# Patient Record
Sex: FEMALE | Race: ASIAN | Employment: FULL TIME | URBAN - METROPOLITAN AREA
[De-identification: names, ages, dates, MRNs, and addresses within clinical notes are randomized per-mention and may not be internally consistent; named-entity substitution may affect disease eponyms.]

---

## 2017-05-03 RX ORDER — ATORVASTATIN CALCIUM 20 MG/1
TABLET, FILM COATED ORAL
Qty: 90 TABLET | Refills: 0 | Status: SHIPPED | OUTPATIENT
Start: 2017-05-03 | End: 2017-10-06

## 2017-05-03 NOTE — TELEPHONE ENCOUNTER
Patient will need to compete lab order and office visit prior to any additional refills being issued

## 2017-09-22 ENCOUNTER — TELEPHONE (OUTPATIENT)
Dept: FAMILY MEDICINE CLINIC | Facility: CLINIC | Age: 57
End: 2017-09-22

## 2017-09-22 DIAGNOSIS — E55.9 VITAMIN D DEFICIENCY: ICD-10-CM

## 2017-09-22 DIAGNOSIS — Z00.00 LABORATORY EXAMINATION ORDERED AS PART OF A ROUTINE GENERAL MEDICAL EXAMINATION: Primary | ICD-10-CM

## 2017-09-22 DIAGNOSIS — E11.9 DIET-CONTROLLED TYPE 2 DIABETES MELLITUS (HCC): ICD-10-CM

## 2017-09-22 NOTE — TELEPHONE ENCOUNTER
Patient is calling she made a WWE w/pap for 11/2/17 at 830 am. She would like orders for blood work sent to [address] [place] [address]. Thank you.

## 2017-09-25 ENCOUNTER — HOSPITAL ENCOUNTER (OUTPATIENT)
Dept: MAMMOGRAPHY | Age: 57
Discharge: HOME | End: 2017-09-25
Attending: FAMILY MEDICINE
Payer: COMMERCIAL

## 2017-09-25 DIAGNOSIS — Z12.31 VISIT FOR SCREENING MAMMOGRAM: ICD-10-CM

## 2017-09-25 PROCEDURE — 77067 SCR MAMMO BI INCL CAD: CPT | Performed by: FAMILY MEDICINE

## 2017-10-06 DIAGNOSIS — E78.5 HYPERLIPIDEMIA LDL GOAL <100: Primary | ICD-10-CM

## 2017-10-06 RX ORDER — ATORVASTATIN CALCIUM 20 MG/1
TABLET, FILM COATED ORAL
Qty: 90 TABLET | Refills: 0 | Status: SHIPPED | OUTPATIENT
Start: 2017-10-06 | End: 2017-11-02

## 2017-10-06 NOTE — TELEPHONE ENCOUNTER
Rx request for med Atorvastatin 20 mg # 90 with 0 refills from [facility]. Last refilled on 5/3/17 # 90 with 0 refills. LOV 12/13/16 with Dr. Dick Lawrence for a typical chest pain. LOV 10/13/16 with Dr. Erin Acosta for annual physical and hyperlipidemia.

## 2017-11-02 ENCOUNTER — OFFICE VISIT (OUTPATIENT)
Dept: FAMILY MEDICINE CLINIC | Facility: CLINIC | Age: 57
End: 2017-11-02

## 2017-11-02 VITALS
HEIGHT: 62 IN | DIASTOLIC BLOOD PRESSURE: 70 MMHG | SYSTOLIC BLOOD PRESSURE: 104 MMHG | BODY MASS INDEX: 24.29 KG/M2 | HEART RATE: 62 BPM | RESPIRATION RATE: 14 BRPM | WEIGHT: 132 LBS | TEMPERATURE: 98 F

## 2017-11-02 DIAGNOSIS — Z01.419 VISIT FOR GYNECOLOGIC EXAMINATION: ICD-10-CM

## 2017-11-02 DIAGNOSIS — E78.5 HYPERLIPIDEMIA LDL GOAL <100: ICD-10-CM

## 2017-11-02 DIAGNOSIS — Z00.00 ANNUAL PHYSICAL EXAM: Primary | ICD-10-CM

## 2017-11-02 DIAGNOSIS — E11.9 DIET-CONTROLLED TYPE 2 DIABETES MELLITUS (HCC): ICD-10-CM

## 2017-11-02 PROCEDURE — 87624 HPV HI-RISK TYP POOLED RSLT: CPT | Performed by: FAMILY MEDICINE

## 2017-11-02 PROCEDURE — 99396 PREV VISIT EST AGE 40-64: CPT | Performed by: FAMILY MEDICINE

## 2017-11-02 PROCEDURE — 88175 CYTOPATH C/V AUTO FLUID REDO: CPT | Performed by: FAMILY MEDICINE

## 2017-11-02 RX ORDER — ATORVASTATIN CALCIUM 20 MG/1
20 TABLET, FILM COATED ORAL DAILY
Qty: 90 TABLET | Refills: 0 | Status: SHIPPED | OUTPATIENT
Start: 2017-11-02 | End: 2018-04-04

## 2017-11-02 NOTE — PROGRESS NOTES
Justin Brewster is a 56year old female who presents for a complete physical exam.   HPI:   Patient presents with:  Physical: WWE w/pap      Her last annual assessment has been over 1 year: Annual Physical due on 10/13/2017          Symptoms: is menopausal. AM   LDL 91 10/30/2017 06:10 AM   NONHDLC 115 10/30/2017 06:10 AM         Lab Results  Component Value Date/Time   A1C 6.0 (H) 10/30/2017 06:10 AM          Last Bryanna category :Karan Hager due on 09/25/2018   [unfilled]  No results found.         Maliha Hudson Specifically:  GEN:  No fever or fatigue  HEAD:  No headaches  EYES:  No vision change  EARS:  No hearing loss  MOUTH/THROAT:  No sore throat or dental problems  HEART:  No chest pain or palpitations  LUNG:  No SOB, cough or wheeze  GI:  No abdominal pain. breath sounds. She has no wheezes. She has no rales. She exhibits no mass and no tenderness. Right breast exhibits no inverted nipple and no tenderness. Left breast exhibits no inverted nipple and no tenderness. Breasts are symmetrical.   Abdominal: Soft. recommended low fat diet and aerobic exercise 30 minutes three times weekly.    Health maintenance, UTD   Immunizations: UTD, flu at work    The [employer] given for: exercise, low fat diet, The patient indicates understanding of these issues and agrees to the rachel

## 2018-04-04 DIAGNOSIS — E78.5 HYPERLIPIDEMIA LDL GOAL <100: ICD-10-CM

## 2018-04-04 RX ORDER — ATORVASTATIN CALCIUM 20 MG/1
TABLET, FILM COATED ORAL
Qty: 90 TABLET | Refills: 1 | Status: SHIPPED | OUTPATIENT
Start: 2018-04-04 | End: 2018-09-30

## 2018-04-04 NOTE — TELEPHONE ENCOUNTER
Incoming request for Atorvastatin. LOV 11/2/2017 and last labs done 10/30/2017. No future appointments. Medication passed protocol. Medication sent.

## 2018-06-04 ENCOUNTER — OFFICE VISIT (OUTPATIENT)
Dept: FAMILY MEDICINE CLINIC | Facility: CLINIC | Age: 58
End: 2018-06-04

## 2018-06-04 VITALS
HEART RATE: 68 BPM | DIASTOLIC BLOOD PRESSURE: 54 MMHG | WEIGHT: 137 LBS | BODY MASS INDEX: 25.21 KG/M2 | HEIGHT: 62 IN | SYSTOLIC BLOOD PRESSURE: 122 MMHG | RESPIRATION RATE: 14 BRPM

## 2018-06-04 DIAGNOSIS — M79.644 FINGER PAIN, RIGHT: Primary | ICD-10-CM

## 2018-06-04 PROCEDURE — 99213 OFFICE O/P EST LOW 20 MIN: CPT | Performed by: FAMILY MEDICINE

## 2018-06-04 NOTE — PROGRESS NOTES
Trena Martin is a 66year old female. HPI:   Patient presents with pain in the right index finger. She has had symptoms for 2 weeks. She thought she may have hit the finger, as she felt there was some mild bruising.   However, she has no recollection simply be some mild tendinitis. At this point I recommended she try not to overuse the finger, especially with typing, mouse work, and use of her phone. I had also like to see her using some ice and ibuprofen 4-600 mg 2-3 times daily.   At this point her

## 2018-08-16 ENCOUNTER — TELEPHONE (OUTPATIENT)
Dept: FAMILY MEDICINE CLINIC | Facility: CLINIC | Age: 58
End: 2018-08-16

## 2018-08-16 DIAGNOSIS — R73.9 HYPERGLYCEMIA: ICD-10-CM

## 2018-08-16 DIAGNOSIS — Z12.31 VISIT FOR SCREENING MAMMOGRAM: ICD-10-CM

## 2018-08-16 DIAGNOSIS — Z00.00 LABORATORY EXAMINATION ORDERED AS PART OF A ROUTINE GENERAL MEDICAL EXAMINATION: ICD-10-CM

## 2018-08-16 NOTE — TELEPHONE ENCOUNTER
Please call in lab orders for pt scheduled physical for 11/5/18 with Dr. Debbie Rousseau please call labs into [facility]. Thank you.

## 2018-09-30 DIAGNOSIS — E78.5 HYPERLIPIDEMIA LDL GOAL <100: ICD-10-CM

## 2018-10-02 RX ORDER — ATORVASTATIN CALCIUM 20 MG/1
TABLET, FILM COATED ORAL
Qty: 90 TABLET | Refills: 1 | Status: SHIPPED | OUTPATIENT
Start: 2018-10-02 | End: 2019-03-30

## 2018-11-05 ENCOUNTER — OFFICE VISIT (OUTPATIENT)
Dept: FAMILY MEDICINE CLINIC | Facility: CLINIC | Age: 58
End: 2018-11-05
Payer: COMMERCIAL

## 2018-11-05 VITALS
TEMPERATURE: 97 F | RESPIRATION RATE: 12 BRPM | HEART RATE: 60 BPM | WEIGHT: 135 LBS | DIASTOLIC BLOOD PRESSURE: 60 MMHG | SYSTOLIC BLOOD PRESSURE: 116 MMHG | HEIGHT: 62 IN | BODY MASS INDEX: 24.84 KG/M2

## 2018-11-05 DIAGNOSIS — E11.9 DIET-CONTROLLED TYPE 2 DIABETES MELLITUS (HCC): ICD-10-CM

## 2018-11-05 DIAGNOSIS — Z00.00 ANNUAL PHYSICAL EXAM: Primary | ICD-10-CM

## 2018-11-05 DIAGNOSIS — E78.5 HYPERLIPIDEMIA LDL GOAL <100: ICD-10-CM

## 2018-11-05 PROCEDURE — 99396 PREV VISIT EST AGE 40-64: CPT | Performed by: FAMILY MEDICINE

## 2018-11-05 PROCEDURE — 82570 ASSAY OF URINE CREATININE: CPT | Performed by: FAMILY MEDICINE

## 2018-11-05 PROCEDURE — 82043 UR ALBUMIN QUANTITATIVE: CPT | Performed by: FAMILY MEDICINE

## 2018-11-05 NOTE — PROGRESS NOTES
Rhett Escalera is a 66year old female who presents for a complete physical exam.   HPI:   Patient presents with:  Physical  Diabetes: due for eye exam, foot exam    Foot pain at times, worse with travel.    Her last annual assessment has been over 1 year: A T4F 1.0 10/30/2017 06:10 AM       Lab Results   Component Value Date/Time    CHOLEST 206 (H) 11/02/2018 05:51 AM    HDL 45 (L) 11/02/2018 05:51 AM    TRIG 167 (H) 11/02/2018 05:51 AM     (H) 11/02/2018 05:51 AM    NONHDLC 161 (H) 11/02/2018 05:51 AM No abdominal pain.   No N/V/D/C  :  No dysuria  MS:  No joint pain or swelling  NEURO:  Denies numbness or tingling  PSYCH:  No mood concerns or anxiety     EXAM:   /60 (BP Location: Left arm)   Pulse 60   Temp 97 °F (36.1 °C) (Oral)   Resp 12   Ht distension. There is no hepatosplenomegaly. There is no tenderness. There is no rebound and no guarding. No hernia. Genitourinary: No breast swelling, tenderness, discharge or bleeding. Musculoskeletal: Normal range of motion. Lymphadenopathy:      Sh fat diet, The patient indicates understanding of these issues and agrees to the plan. The patient is asked to return for CPX in 1 years.     Assessment:  Problem List Items Addressed This Visit        Cardiovascular    Hyperlipidemia LDL goal <100    Overv

## 2018-11-06 NOTE — ASSESSMENT & PLAN NOTE
As for her Diabetes, it is well controlled, no significant medication side effects noted. Recommendations are: continue present meds, lose weight by increased dietary compliance and exercise and will check labs as ordered.     Lab Results   Component Va

## 2018-11-07 ENCOUNTER — TELEPHONE (OUTPATIENT)
Dept: FAMILY MEDICINE CLINIC | Facility: CLINIC | Age: 58
End: 2018-11-07

## 2018-11-07 NOTE — TELEPHONE ENCOUNTER
Pt left message upset that on her History list Type 2 Diabetes was listed. Pt  states she never was truly a Diabetic, and wants Dx removed. Please advise. Routed to Dr Rochelle Sandy.

## 2018-11-07 NOTE — TELEPHONE ENCOUNTER
I specifically talked to her about it at visit. From 10/1/2014:Patient complains of elevated FBS< hx GDM, A1c now up ot 6.4 and we discuused options for Diet controlled DM, dx  Today. .     She is purely diet-controlled we do not need to do anything more b

## 2018-11-07 NOTE — TELEPHONE ENCOUNTER
Patient saw online that she was a type 2 diabetic. Patient states she is not and wants that removed and more information. She has no idea why that is noted on her chart.

## 2018-11-08 NOTE — TELEPHONE ENCOUNTER
Spoke with Pt and explain MD explanation as to why Pt still classifies as a Diabetic, but encouraged Pt that numbers have improved over the last few years.

## 2019-03-30 DIAGNOSIS — E78.5 HYPERLIPIDEMIA LDL GOAL <100: ICD-10-CM

## 2019-04-03 RX ORDER — ATORVASTATIN CALCIUM 20 MG/1
TABLET, FILM COATED ORAL
Qty: 90 TABLET | Refills: 1 | Status: SHIPPED | OUTPATIENT
Start: 2019-04-03 | End: 2019-09-25

## 2019-04-03 NOTE — TELEPHONE ENCOUNTER
Medication refilled per protocol.      Requested Prescriptions     Signed Prescriptions Disp Refills   • ATORVASTATIN 20 MG Oral Tab 90 tablet 1     Sig: TAKE 1 TABLET DAILY     Authorizing Provider: Awilda Macias     Ordering User: Chiara Mart

## 2019-07-08 DIAGNOSIS — Z13.5 SCREENING FOR DIABETIC RETINOPATHY: Primary | ICD-10-CM

## 2019-09-25 DIAGNOSIS — E78.5 HYPERLIPIDEMIA LDL GOAL <100: ICD-10-CM

## 2019-09-26 RX ORDER — ATORVASTATIN CALCIUM 20 MG/1
TABLET, FILM COATED ORAL
Qty: 90 TABLET | Refills: 4 | Status: SHIPPED | OUTPATIENT
Start: 2019-09-26 | End: 2020-12-19

## 2019-09-26 NOTE — TELEPHONE ENCOUNTER
Requested Prescriptions     Pending Prescriptions Disp Refills   • ATORVASTATIN 20 MG Oral Tab [Pharmacy Med Name: ATORVASTATIN TABS 20MG] 90 tablet 4     Sig: TAKE 1 TABLET DAILY     LOV 11/5/2018     Patient was asked to follow-up in: one year    Appoint

## 2019-11-04 ENCOUNTER — TELEPHONE (OUTPATIENT)
Dept: FAMILY MEDICINE CLINIC | Facility: CLINIC | Age: 59
End: 2019-11-04

## 2019-11-04 DIAGNOSIS — Z12.31 ENCOUNTER FOR SCREENING MAMMOGRAM FOR BREAST CANCER: Primary | ICD-10-CM

## 2019-11-04 DIAGNOSIS — R73.9 HYPERGLYCEMIA: ICD-10-CM

## 2019-11-04 DIAGNOSIS — Z12.31 VISIT FOR SCREENING MAMMOGRAM: ICD-10-CM

## 2019-11-04 DIAGNOSIS — Z00.00 LABORATORY EXAMINATION ORDERED AS PART OF A ROUTINE GENERAL MEDICAL EXAMINATION: ICD-10-CM

## 2019-11-04 DIAGNOSIS — E11.9 DIET-CONTROLLED TYPE 2 DIABETES MELLITUS (HCC): Primary | ICD-10-CM

## 2019-11-04 NOTE — TELEPHONE ENCOUNTER
Diagnoses and all orders for this visit:    Diet-controlled type 2 diabetes mellitus ([facility] [address]  -     HEMOGLOBIN A1C  -     MICROALB/CREAT RATIO, RANDOM URINE    Visit for screening mammogram  -     [facility] SCREENING BILAT (CPT=77067);  Future    Laboratory examina

## 2019-11-04 NOTE — TELEPHONE ENCOUNTER
Please enter lab orders for the patient's upcoming physical appointment. Physical scheduled:    Your appointments     Date & Time Appointment Department [facility])    Nov 15, 2019 12:00 PM CST Physical - Established with Won Canseco MD [facility]

## 2019-11-11 ENCOUNTER — HOSPITAL ENCOUNTER (OUTPATIENT)
Dept: MAMMOGRAPHY | Age: 59
Discharge: HOME | End: 2019-11-11
Attending: FAMILY MEDICINE
Payer: COMMERCIAL

## 2019-11-11 DIAGNOSIS — Z12.31 VISIT FOR SCREENING MAMMOGRAM: ICD-10-CM

## 2019-11-11 PROCEDURE — 77067 SCR MAMMO BI INCL CAD: CPT | Performed by: FAMILY MEDICINE

## 2019-11-11 PROCEDURE — 77063 BREAST TOMOSYNTHESIS BI: CPT | Performed by: FAMILY MEDICINE

## 2019-11-15 ENCOUNTER — OFFICE VISIT (OUTPATIENT)
Dept: FAMILY MEDICINE CLINIC | Facility: CLINIC | Age: 59
End: 2019-11-15
Payer: COMMERCIAL

## 2019-11-15 VITALS
TEMPERATURE: 98 F | HEART RATE: 60 BPM | DIASTOLIC BLOOD PRESSURE: 62 MMHG | HEIGHT: 62.5 IN | BODY MASS INDEX: 24.22 KG/M2 | SYSTOLIC BLOOD PRESSURE: 92 MMHG | RESPIRATION RATE: 12 BRPM | WEIGHT: 135 LBS

## 2019-11-15 DIAGNOSIS — E78.2 MIXED HYPERLIPIDEMIA: ICD-10-CM

## 2019-11-15 DIAGNOSIS — E03.9 ACQUIRED HYPOTHYROIDISM: ICD-10-CM

## 2019-11-15 DIAGNOSIS — Z00.00 ANNUAL PHYSICAL EXAM: Primary | ICD-10-CM

## 2019-11-15 DIAGNOSIS — E11.9 DIET-CONTROLLED TYPE 2 DIABETES MELLITUS (HCC): ICD-10-CM

## 2019-11-15 PROCEDURE — 99396 PREV VISIT EST AGE 40-64: CPT | Performed by: FAMILY MEDICINE

## 2019-11-15 NOTE — ASSESSMENT & PLAN NOTE
Diagnosed today. Further work-up in progress. Currently, she is not ready for medications but I am open to ordering it if this does happen.   In the meantime, information is given and repeat levels in 3 months  Thyroid  (most recent labs)   Lab Results

## 2019-11-15 NOTE — PROGRESS NOTES
Alissa Johnson is a 64year old female who presents for a complete physical exam.   HPI:   Patient presents with:  Physical: last pap 11/2/17- normal, last mammo 11/11/19- benign     Her last annual assessment has been over 1 year: Annual Physical due on 11 AM    ALB 4.4 11/09/2019 06:40 AM    TP 6.9 11/09/2019 06:40 AM    ALKPHO 71 11/09/2019 06:40 AM    AST 20 11/09/2019 06:40 AM    ALT 29 11/09/2019 06:40 AM    BILT 0.9 11/09/2019 06:40 AM    TSH 2.25 09/29/2014 08:50 AM    T4F 1.1 11/09/2019 06:40 AM point review of systems was completed. Pertinent positives and negatives noted in the the HPI.   Specifically:  GEN:  No fever or fatigue  HEAD:  No headaches  EYES:  No vision change  EARS:  No hearing loss  MOUTH/THROAT:  No sore throat or dental problem not present. Carotid bruit not present. Pulmonary/Chest: Effort normal and breath sounds normal. No respiratory distress. She has no decreased breath sounds. She has no wheezes. She has no rales. She exhibits no tenderness. Abdominal: Soft.  Bowel sounds for: exercise, low fat diet, The patient indicates understanding of these issues and agrees to the plan. The patient is asked to return for CPX in 1 years.     Assessment:  Problem List Items Addressed This Visit        Cardiovascular    Mixed hyperlipidem

## 2019-11-15 NOTE — PATIENT INSTRUCTIONS
PM meds are Benadryl,   Unasom is better for sleep      Common Thyroid Problems    The thyroid is a gland in the neck. It makes thyroid hormone. A hormone is a chemical messenger for the body.  If there is a problem with the thyroid, the level of thyroid ho common in people who’ve had medical radiation to the head or neck. Sometimes nodules can be felt on the outside of the neck. Most of the time, the nodules cause no symptoms and don’t affect the amount of thyroid hormone. Most nodules are not cancer.  But so

## 2020-12-09 ENCOUNTER — TELEPHONE (OUTPATIENT)
Dept: FAMILY MEDICINE CLINIC | Facility: CLINIC | Age: 60
End: 2020-12-09

## 2020-12-09 DIAGNOSIS — E11.9 DIET-CONTROLLED TYPE 2 DIABETES MELLITUS (HCC): Primary | ICD-10-CM

## 2020-12-09 DIAGNOSIS — E03.9 ACQUIRED HYPOTHYROIDISM: ICD-10-CM

## 2020-12-09 DIAGNOSIS — Z00.00 LABORATORY EXAMINATION ORDERED AS PART OF A ROUTINE GENERAL MEDICAL EXAMINATION: ICD-10-CM

## 2020-12-09 DIAGNOSIS — E78.2 MIXED HYPERLIPIDEMIA: ICD-10-CM

## 2020-12-09 DIAGNOSIS — Z12.31 VISIT FOR SCREENING MAMMOGRAM: ICD-10-CM

## 2020-12-09 DIAGNOSIS — E55.9 VITAMIN D DEFICIENCY: ICD-10-CM

## 2020-12-09 DIAGNOSIS — R73.9 HYPERGLYCEMIA: ICD-10-CM

## 2020-12-09 NOTE — TELEPHONE ENCOUNTER
Diagnoses and all orders for this visit:    Diet-controlled type 2 diabetes mellitus ([facility] [address]  -     COMP METABOLIC PANEL (14)  -     LIPID PANEL  -     HEMOGLOBIN A1C  -     MICROALB/CREAT RATIO, RANDOM URINE    Acquired hypothyroidism  -     TSH W REFLEX TO

## 2020-12-09 NOTE — TELEPHONE ENCOUNTER
Please enter lab orders for the patient's upcoming physical appointment. Physical scheduled:    Your appointments     Date & Time Appointment Department [facility])    Jan 07, 2021  9:30 AM CST Physical - Established with Ellie Russo MD [employer] [facility]

## 2020-12-18 DIAGNOSIS — E78.5 HYPERLIPIDEMIA LDL GOAL <100: ICD-10-CM

## 2020-12-19 RX ORDER — ATORVASTATIN CALCIUM 20 MG/1
TABLET, FILM COATED ORAL
Qty: 90 TABLET | Refills: 3 | Status: SHIPPED | OUTPATIENT
Start: 2020-12-19 | End: 2021-11-26

## 2020-12-19 NOTE — TELEPHONE ENCOUNTER
Requested Prescriptions     Pending Prescriptions Disp Refills   • ATORVASTATIN 20 MG Oral Tab [Pharmacy Med Name: ATORVASTATIN TABS 20MG] 90 tablet 3     Sig: TAKE 1 TABLET DAILY     LOV 11/12/2019    Patient was asked to follow-up in: 4 months    Appoint

## 2021-02-01 ENCOUNTER — TELEPHONE (OUTPATIENT)
Dept: FAMILY MEDICINE CLINIC | Facility: CLINIC | Age: 61
End: 2021-02-01

## 2021-02-01 DIAGNOSIS — E03.9 ACQUIRED HYPOTHYROIDISM: Primary | ICD-10-CM

## 2021-02-01 RX ORDER — LEVOTHYROXINE SODIUM 0.05 MG/1
50 TABLET ORAL DAILY
Qty: 90 TABLET | Refills: 3 | Status: SHIPPED | OUTPATIENT
Start: 2021-02-01 | End: 2021-04-15

## 2021-02-01 NOTE — TELEPHONE ENCOUNTER
LOV 11/15/2019. No future appointments. Pt had appt schedules 1/7/2021, but she cancelled it. On 12/14/2020, Dr Juan Daniel Reid indicated that TSH is again elevated and you would discuss treatment at 1/7/2021 appt.     Spoke to pt and she states that she is too busy

## 2021-02-01 NOTE — TELEPHONE ENCOUNTER
Pt is calling Dr. Zenovia Cooks recommended she start medication for her thyroid she said please send to [facility] to start and then to see if it can go to mail order. She said Dr. Zenovia Cooks knows what it is.  She would like a call back from the nurse to let her know evelyn

## 2021-02-02 NOTE — TELEPHONE ENCOUNTER
Diagnoses and all orders for this visit:    Acquired hypothyroidism  -     Levothyroxine Sodium 50 MCG Oral Tab; Take 1 tablet (50 mcg total) by mouth daily.  -     TSH+FREE T4; Future       Will start 50 mcg and recheck in about 6 or 8 weeks.   Okay to not

## 2021-02-02 NOTE — TELEPHONE ENCOUNTER
Patient notified of below directives. She verbalized understanding and agrees with plan. Encouraged pt to schedule appt soon.

## 2021-04-09 DIAGNOSIS — Z23 NEED FOR VACCINATION: ICD-10-CM

## 2021-04-13 ENCOUNTER — LAB ENCOUNTER (OUTPATIENT)
Dept: LAB | Age: 61
End: 2021-04-13
Attending: FAMILY MEDICINE
Payer: COMMERCIAL

## 2021-04-13 DIAGNOSIS — E03.9 ACQUIRED HYPOTHYROIDISM: ICD-10-CM

## 2021-04-13 PROCEDURE — 84443 ASSAY THYROID STIM HORMONE: CPT | Performed by: FAMILY MEDICINE

## 2021-04-13 PROCEDURE — 84439 ASSAY OF FREE THYROXINE: CPT | Performed by: FAMILY MEDICINE

## 2021-04-13 PROCEDURE — 36415 COLL VENOUS BLD VENIPUNCTURE: CPT | Performed by: FAMILY MEDICINE

## 2021-04-15 ENCOUNTER — OFFICE VISIT (OUTPATIENT)
Dept: FAMILY MEDICINE CLINIC | Facility: CLINIC | Age: 61
End: 2021-04-15
Payer: COMMERCIAL

## 2021-04-15 VITALS
BODY MASS INDEX: 24.9 KG/M2 | DIASTOLIC BLOOD PRESSURE: 66 MMHG | HEART RATE: 62 BPM | TEMPERATURE: 98 F | WEIGHT: 138.81 LBS | RESPIRATION RATE: 18 BRPM | SYSTOLIC BLOOD PRESSURE: 110 MMHG | HEIGHT: 62.5 IN

## 2021-04-15 DIAGNOSIS — Z01.89 ROUTINE LAB DRAW: ICD-10-CM

## 2021-04-15 DIAGNOSIS — E11.9 DIET-CONTROLLED TYPE 2 DIABETES MELLITUS (HCC): Primary | ICD-10-CM

## 2021-04-15 DIAGNOSIS — E03.9 ACQUIRED HYPOTHYROIDISM: ICD-10-CM

## 2021-04-15 DIAGNOSIS — E78.2 MIXED HYPERLIPIDEMIA: ICD-10-CM

## 2021-04-15 PROCEDURE — 3008F BODY MASS INDEX DOCD: CPT | Performed by: FAMILY MEDICINE

## 2021-04-15 PROCEDURE — 3078F DIAST BP <80 MM HG: CPT | Performed by: FAMILY MEDICINE

## 2021-04-15 PROCEDURE — 99213 OFFICE O/P EST LOW 20 MIN: CPT | Performed by: FAMILY MEDICINE

## 2021-04-15 PROCEDURE — 3074F SYST BP LT 130 MM HG: CPT | Performed by: FAMILY MEDICINE

## 2021-04-15 RX ORDER — LEVOTHYROXINE SODIUM 0.03 MG/1
25 TABLET ORAL DAILY
Qty: 90 TABLET | Refills: 3 | Status: SHIPPED | OUTPATIENT
Start: 2021-04-15 | End: 2021-09-01

## 2021-04-15 NOTE — ASSESSMENT & PLAN NOTE
50 mcg to start, now over treated.   Thyroid  (most recent labs)   Lab Results   Component Value Date/Time    TSH 0.138 (L) 04/13/2021 03:33 PM    T4F 1.3 04/13/2021 03:33 PM    TSHT4 7.54 (H) 12/14/2020 06:18 AM         Endocrine Medications          Levot

## 2021-04-15 NOTE — PROGRESS NOTES
Bean Montesinos is a 63year old female coming in for had concerns including Lab Results (4/13 Tsh ). HPI/Subjective:   HPI follow up thryois. TSH was 7, now 0.15, feeling foos.  ileana CONNER    ROS: GENERAL HEALTH: feels well otherwise    Objective: 2020  Assessment & Plan:  50 mcg to start, now over treated.   Thyroid  (most recent labs)   Lab Results   Component Value Date/Time    TSH 0.138 (L) 04/13/2021 03:33 PM    T4F 1.3 04/13/2021 03:33 PM    TSHT4 7.54 (H) 12/14/2020 06:18 AM         Endocrine

## 2021-04-21 ENCOUNTER — IMMUNIZATION (OUTPATIENT)
Dept: LAB | Age: 61
End: 2021-04-21
Attending: HOSPITALIST
Payer: COMMERCIAL

## 2021-04-21 DIAGNOSIS — Z23 NEED FOR VACCINATION: Primary | ICD-10-CM

## 2021-04-21 PROCEDURE — 0002A SARSCOV2 VAC 30MCG/0.3ML IM: CPT

## 2021-08-24 ENCOUNTER — HOSPITAL ENCOUNTER (OUTPATIENT)
Dept: MAMMOGRAPHY | Age: 61
Discharge: HOME | End: 2021-08-24
Attending: FAMILY MEDICINE
Payer: COMMERCIAL

## 2021-08-24 DIAGNOSIS — Z12.31 VISIT FOR SCREENING MAMMOGRAM: ICD-10-CM

## 2021-08-24 PROCEDURE — 77067 SCR MAMMO BI INCL CAD: CPT | Performed by: FAMILY MEDICINE

## 2021-08-24 PROCEDURE — 77063 BREAST TOMOSYNTHESIS BI: CPT | Performed by: FAMILY MEDICINE

## 2021-08-25 PROCEDURE — 3044F HG A1C LEVEL LT 7.0%: CPT | Performed by: FAMILY MEDICINE

## 2021-08-26 LAB
ABSOLUTE BASOPHILS: 27 CELLS/UL (ref 0–200)
ABSOLUTE EOSINOPHILS: 68 CELLS/UL (ref 15–500)
ABSOLUTE LYMPHOCYTES: 1965 CELLS/UL (ref 850–3900)
ABSOLUTE MONOCYTES: 469 CELLS/UL (ref 200–950)
ABSOLUTE NEUTROPHILS: 4270 CELLS/UL (ref 1500–7800)
ALBUMIN/GLOBULIN RATIO: 1.8 (CALC) (ref 1–2.5)
ALBUMIN: 4.2 G/DL (ref 3.6–5.1)
ALKALINE PHOSPHATASE: 77 U/L (ref 37–153)
ALT: 32 U/L (ref 6–29)
AST: 24 U/L (ref 10–35)
BASOPHILS: 0.4 %
BILIRUBIN, TOTAL: 0.8 MG/DL (ref 0.2–1.2)
BUN: 13 MG/DL (ref 7–25)
CALCIUM: 9.3 MG/DL (ref 8.6–10.4)
CARBON DIOXIDE: 29 MMOL/L (ref 20–32)
CHLORIDE: 105 MMOL/L (ref 98–110)
CHOL/HDLC RATIO: 3.1 (CALC)
CHOLESTEROL, TOTAL: 151 MG/DL
CREATININE: 0.64 MG/DL (ref 0.5–1.05)
EGFR IF AFRICN AM: 113 ML/MIN/1.73M2
EGFR IF NONAFRICN AM: 98 ML/MIN/1.73M2
EOSINOPHILS: 1 %
GLOBULIN: 2.3 G/DL (CALC) (ref 1.9–3.7)
GLUCOSE: 110 MG/DL (ref 65–99)
HDL CHOLESTEROL: 49 MG/DL
HEMATOCRIT: 43.8 % (ref 35–45)
HEMOGLOBIN A1C: 6.2 % OF TOTAL HGB
HEMOGLOBIN: 14.3 G/DL (ref 11.7–15.5)
LDL-CHOLESTEROL: 66 MG/DL (CALC)
LYMPHOCYTES: 28.9 %
MCH: 30 PG (ref 27–33)
MCHC: 32.6 G/DL (ref 32–36)
MCV: 91.8 FL (ref 80–100)
MONOCYTES: 6.9 %
MPV: 9.5 FL (ref 7.5–12.5)
NEUTROPHILS: 62.8 %
NON-HDL CHOLESTEROL: 102 MG/DL (CALC)
PLATELET COUNT: 214 THOUSAND/UL (ref 140–400)
POTASSIUM: 4 MMOL/L (ref 3.5–5.3)
PROTEIN, TOTAL: 6.5 G/DL (ref 6.1–8.1)
RDW: 12.6 % (ref 11–15)
RED BLOOD CELL COUNT: 4.77 MILLION/UL (ref 3.8–5.1)
SODIUM: 139 MMOL/L (ref 135–146)
TRIGLYCERIDES: 275 MG/DL
VITAMIN D, 25-OH, TOTAL: 39 NG/ML (ref 30–100)
WHITE BLOOD CELL COUNT: 6.8 THOUSAND/UL (ref 3.8–10.8)

## 2021-08-28 NOTE — ASSESSMENT & PLAN NOTE
Stable, Continue present management.     Thyroid  (most recent labs)   Lab Results   Component Value Date/Time    TSH 0.138 (L) 04/13/2021 03:33 PM    T4F 1.3 04/13/2021 03:33 PM    TSHT4 7.54 (H) 12/14/2020 06:18 AM         Endocrine Medications          L

## 2021-08-30 ENCOUNTER — OFFICE VISIT (OUTPATIENT)
Dept: FAMILY MEDICINE CLINIC | Facility: CLINIC | Age: 61
End: 2021-08-30
Payer: COMMERCIAL

## 2021-08-30 VITALS
HEART RATE: 86 BPM | RESPIRATION RATE: 16 BRPM | HEIGHT: 62 IN | BODY MASS INDEX: 24.59 KG/M2 | SYSTOLIC BLOOD PRESSURE: 130 MMHG | WEIGHT: 133.63 LBS | DIASTOLIC BLOOD PRESSURE: 74 MMHG

## 2021-08-30 DIAGNOSIS — E78.2 MIXED HYPERLIPIDEMIA: ICD-10-CM

## 2021-08-30 DIAGNOSIS — Z00.00 ANNUAL PHYSICAL EXAM: Primary | ICD-10-CM

## 2021-08-30 DIAGNOSIS — Z01.419 VISIT FOR GYNECOLOGIC EXAMINATION: ICD-10-CM

## 2021-08-30 DIAGNOSIS — E03.9 ACQUIRED HYPOTHYROIDISM: ICD-10-CM

## 2021-08-30 DIAGNOSIS — E11.9 DIET-CONTROLLED TYPE 2 DIABETES MELLITUS (HCC): ICD-10-CM

## 2021-08-30 PROCEDURE — 87624 HPV HI-RISK TYP POOLED RSLT: CPT | Performed by: FAMILY MEDICINE

## 2021-08-30 PROCEDURE — 99396 PREV VISIT EST AGE 40-64: CPT | Performed by: FAMILY MEDICINE

## 2021-08-30 PROCEDURE — 90471 IMMUNIZATION ADMIN: CPT | Performed by: FAMILY MEDICINE

## 2021-08-30 PROCEDURE — 3008F BODY MASS INDEX DOCD: CPT | Performed by: FAMILY MEDICINE

## 2021-08-30 PROCEDURE — 3075F SYST BP GE 130 - 139MM HG: CPT | Performed by: FAMILY MEDICINE

## 2021-08-30 PROCEDURE — 90472 IMMUNIZATION ADMIN EACH ADD: CPT | Performed by: FAMILY MEDICINE

## 2021-08-30 PROCEDURE — 90732 PPSV23 VACC 2 YRS+ SUBQ/IM: CPT | Performed by: FAMILY MEDICINE

## 2021-08-30 PROCEDURE — 90750 HZV VACC RECOMBINANT IM: CPT | Performed by: FAMILY MEDICINE

## 2021-08-30 PROCEDURE — 3078F DIAST BP <80 MM HG: CPT | Performed by: FAMILY MEDICINE

## 2021-08-30 NOTE — PROGRESS NOTES
Crystal Kevin is a 63year old female who presents for a complete physical exam.     had concerns including Physical (WWE, with pap ).    Her last annual assessment has been over 1 year: Annual Physical due on 11/15/2020       HPI/Subjective:     Symptoms: Never done  FIT/FOBT Colorectal Screening Never done  Zoster Vaccines(1 of 2) Never done  Diabetes Care Dilated Eye Exam due on 12/03/2019  Colonoscopy due on 03/03/2020  Annual Depression Screen due on 11/15/2020  Annual Physical due on 11/15/2020      Re Not on file      Highest education level: Not on file    Occupational History      Occupation: Engineering     Tobacco Use      Smoking status: Never Smoker      Smokeless tobacco: Never Used    Substance and Sexual Activity      Alcohol use:  Yes        Al S2 normal. No murmur heard. Pulmonary:      Effort: Pulmonary effort is normal.      Breath sounds: Normal breath sounds. Chest:      Chest wall: No mass. Breasts: Breasts are symmetrical.         Right: No inverted nipple or tenderness. 10/28/2020   • Flucelvax 0.5 Ml Quad PFS Single Dose 10/15/2019   • Influenza 10/01/2016, 10/16/2017, 10/29/2018   • TDAP 10/01/2014         Pt info given for: exercise, low fat diet, The patient indicates understanding of these issues and agrees to the pl ThinPrep PAP Smear B      I am having Ajay Josias maintain her Vitamin D3 (Cholecalciferol), aspirin, atorvastatin, and levothyroxine. Return in about 6 months (around 2/28/2022) for diabetes follow up.

## 2021-08-31 ENCOUNTER — TELEPHONE (OUTPATIENT)
Dept: FAMILY MEDICINE CLINIC | Facility: CLINIC | Age: 61
End: 2021-08-31

## 2021-08-31 DIAGNOSIS — E03.9 ACQUIRED HYPOTHYROIDISM: Primary | ICD-10-CM

## 2021-08-31 NOTE — TELEPHONE ENCOUNTER
Pt calling to ask if she is still suppose to take thyroid medication. If so, she will need [facility]

## 2021-09-01 RX ORDER — LEVOTHYROXINE SODIUM 0.03 MG/1
25 TABLET ORAL DAILY
Qty: 90 TABLET | Refills: 3 | Status: SHIPPED | OUTPATIENT
Start: 2021-09-01 | End: 2022-09-01

## 2021-09-01 NOTE — TELEPHONE ENCOUNTER
1. Acquired hypothyroidism (Primary)  Overview:  Dx 11/15/2019 with TSH 4.77 in 2017 and 7.3 in 11/2019, Levothyroxine 50 started fall 2020  Orders:  -     Levothyroxine Sodium; Take 1 tablet (25 mcg total) by mouth daily. Dispense: 90 tablet;  Refill: 3

## 2021-09-01 NOTE — TELEPHONE ENCOUNTER
Called and LMOM that Dr Lidia Villalobos wants her to continue the levothyroxine and sent it to her mail order

## 2021-09-02 LAB — HPV I/H RISK 1 DNA SPEC QL NAA+PROBE: NEGATIVE

## 2021-11-25 DIAGNOSIS — E78.5 HYPERLIPIDEMIA LDL GOAL <100: ICD-10-CM

## 2021-11-26 RX ORDER — ATORVASTATIN CALCIUM 20 MG/1
TABLET, FILM COATED ORAL
Qty: 90 TABLET | Refills: 0 | Status: SHIPPED | OUTPATIENT
Start: 2021-11-26

## 2021-11-26 NOTE — TELEPHONE ENCOUNTER
Medication refilled per protocol.      Requested Prescriptions     Signed Prescriptions Disp Refills   • ATORVASTATIN 20 MG Oral Tab 90 tablet 0     Sig: TAKE 1 TABLET DAILY     Authorizing Provider: Bethany Fung     Ordering User: Evan Moore

## 2022-05-04 ENCOUNTER — TELEPHONE (OUTPATIENT)
Dept: FAMILY MEDICINE CLINIC | Facility: CLINIC | Age: 62
End: 2022-05-04

## 2022-05-04 NOTE — TELEPHONE ENCOUNTER
LOV 8/30/21 and to follow up in 6 months. Spoke with patient. States the last couple nights BP was around 127/75. Last night was 149/90 something. Patient reports BP slightly elevated during LOV and taken twice. Thought to be due to nerves but was advised to keep an eye on it. Work has been stressful and not sleeping well. Denies any headaches, chest pains, vision issues or breathing problems. To be seen if develops. Advised appointment is needed. Call transferred for scheduling. Patient notified. Patient verbalized understanding of information given.

## 2022-05-04 NOTE — TELEPHONE ENCOUNTER
Pt stats that her BP is high. Took it lat night 149/80 something? Oracio Alvarez  Requesting to speak to a nurse

## 2022-05-10 PROCEDURE — 3061F NEG MICROALBUMINURIA REV: CPT | Performed by: FAMILY MEDICINE

## 2022-05-11 LAB
CREATININE, RANDOM URINE: 106 MG/DL (ref 20–275)
MICROALBUMIN/CREATININE RATIO, RANDOM URINE: 8 MCG/MG CREAT
MICROALBUMIN: 0.9 MG/DL
T4, FREE: 1.3 NG/DL (ref 0.8–1.8)
TSH: 4.24 MIU/L (ref 0.4–4.5)

## 2022-05-13 ENCOUNTER — OFFICE VISIT (OUTPATIENT)
Dept: FAMILY MEDICINE CLINIC | Facility: CLINIC | Age: 62
End: 2022-05-13
Payer: COMMERCIAL

## 2022-05-13 VITALS
DIASTOLIC BLOOD PRESSURE: 70 MMHG | RESPIRATION RATE: 18 BRPM | WEIGHT: 143.19 LBS | SYSTOLIC BLOOD PRESSURE: 136 MMHG | BODY MASS INDEX: 26.35 KG/M2 | HEIGHT: 62 IN | HEART RATE: 76 BPM

## 2022-05-13 DIAGNOSIS — Z00.00 LABORATORY EXAMINATION ORDERED AS PART OF A ROUTINE GENERAL MEDICAL EXAMINATION: ICD-10-CM

## 2022-05-13 DIAGNOSIS — E78.2 MIXED HYPERLIPIDEMIA: ICD-10-CM

## 2022-05-13 DIAGNOSIS — E11.9 DIET-CONTROLLED TYPE 2 DIABETES MELLITUS (HCC): Primary | ICD-10-CM

## 2022-05-13 DIAGNOSIS — E03.9 ACQUIRED HYPOTHYROIDISM: ICD-10-CM

## 2022-05-13 DIAGNOSIS — I10 ESSENTIAL HYPERTENSION: ICD-10-CM

## 2022-05-13 DIAGNOSIS — M77.12 LEFT LATERAL EPICONDYLITIS: ICD-10-CM

## 2022-05-13 DIAGNOSIS — Z12.11 SCREENING FOR MALIGNANT NEOPLASM OF COLON: ICD-10-CM

## 2022-05-13 PROCEDURE — 3075F SYST BP GE 130 - 139MM HG: CPT | Performed by: FAMILY MEDICINE

## 2022-05-13 PROCEDURE — 3008F BODY MASS INDEX DOCD: CPT | Performed by: FAMILY MEDICINE

## 2022-05-13 PROCEDURE — 99214 OFFICE O/P EST MOD 30 MIN: CPT | Performed by: FAMILY MEDICINE

## 2022-05-13 PROCEDURE — 3078F DIAST BP <80 MM HG: CPT | Performed by: FAMILY MEDICINE

## 2022-05-13 RX ORDER — ATORVASTATIN CALCIUM 20 MG/1
20 TABLET, FILM COATED ORAL DAILY
Qty: 90 TABLET | Refills: 3 | Status: SHIPPED | OUTPATIENT
Start: 2022-05-13

## 2022-05-13 RX ORDER — LOSARTAN POTASSIUM 50 MG/1
50 TABLET ORAL DAILY
Qty: 90 TABLET | Refills: 3 | Status: SHIPPED | OUTPATIENT
Start: 2022-05-13

## 2022-05-13 RX ORDER — LEVOTHYROXINE SODIUM 0.03 MG/1
TABLET ORAL
Qty: 135 TABLET | Refills: 3 | Status: SHIPPED | OUTPATIENT
Start: 2022-05-13 | End: 2023-05-13

## 2022-05-13 NOTE — ASSESSMENT & PLAN NOTE
As for her Diabetes, it is well controlled, no significant medication side effects noted. Recommendations are: continue present meds, lose weight by increased dietary compliance and exercise and will check labs as ordered.     Lab Results   Component Value Date    A1C 6.2 (H) 08/25/2021    A1C 6.3 (H) 12/14/2020

## 2022-05-13 NOTE — ASSESSMENT & PLAN NOTE
Stable, Continue present management.     Thyroid  (most recent labs)   Lab Results   Component Value Date/Time    TSH 4.24 05/10/2022 06:27 AM    T4F 1.3 05/10/2022 06:27 AM    TSHT4 7.54 (H) 12/14/2020 06:18 AM         Endocrine Medications          levothyroxine 25 MCG Oral Tab

## 2022-10-05 PROBLEM — D12.5 BENIGN NEOPLASM OF SIGMOID COLON: Status: ACTIVE | Noted: 2022-10-05

## 2022-10-05 PROBLEM — Z86.0101 HISTORY OF ADENOMATOUS POLYP OF COLON: Status: ACTIVE | Noted: 2022-10-05

## 2022-10-05 PROBLEM — D12.3 BENIGN NEOPLASM OF TRANSVERSE COLON: Status: ACTIVE | Noted: 2022-10-05

## 2022-10-05 PROBLEM — Z86.010 HISTORY OF ADENOMATOUS POLYP OF COLON: Status: ACTIVE | Noted: 2022-10-05

## 2022-11-10 ENCOUNTER — TELEPHONE (OUTPATIENT)
Dept: FAMILY MEDICINE CLINIC | Facility: CLINIC | Age: 62
End: 2022-11-10

## 2022-11-10 DIAGNOSIS — Z00.00 LABORATORY EXAMINATION ORDERED AS PART OF A ROUTINE GENERAL MEDICAL EXAMINATION: ICD-10-CM

## 2022-11-10 DIAGNOSIS — Z12.31 VISIT FOR SCREENING MAMMOGRAM: ICD-10-CM

## 2022-11-10 DIAGNOSIS — R73.9 HYPERGLYCEMIA: ICD-10-CM

## 2022-11-10 PROCEDURE — 3044F HG A1C LEVEL LT 7.0%: CPT | Performed by: FAMILY MEDICINE

## 2022-11-11 LAB
ABSOLUTE BASOPHILS: 48 CELLS/UL (ref 0–200)
ABSOLUTE EOSINOPHILS: 82 CELLS/UL (ref 15–500)
ABSOLUTE LYMPHOCYTES: 2237 CELLS/UL (ref 850–3900)
ABSOLUTE MONOCYTES: 462 CELLS/UL (ref 200–950)
ABSOLUTE NEUTROPHILS: 3971 CELLS/UL (ref 1500–7800)
ALBUMIN/GLOBULIN RATIO: 1.9 (CALC) (ref 1–2.5)
ALBUMIN: 4.6 G/DL (ref 3.6–5.1)
ALKALINE PHOSPHATASE: 72 U/L (ref 37–153)
ALT: 21 U/L (ref 6–29)
AST: 22 U/L (ref 10–35)
BASOPHILS: 0.7 %
BILIRUBIN, TOTAL: 1.1 MG/DL (ref 0.2–1.2)
BUN: 10 MG/DL (ref 7–25)
CALCIUM: 10.1 MG/DL (ref 8.6–10.4)
CARBON DIOXIDE: 29 MMOL/L (ref 20–32)
CHLORIDE: 104 MMOL/L (ref 98–110)
CHOL/HDLC RATIO: 2.9 (CALC)
CHOLESTEROL, TOTAL: 151 MG/DL
CREATININE: 0.65 MG/DL (ref 0.5–1.05)
EGFR: 101 ML/MIN/1.73M2
EOSINOPHILS: 1.2 %
GLOBULIN: 2.4 G/DL (CALC) (ref 1.9–3.7)
GLUCOSE: 99 MG/DL (ref 65–99)
HDL CHOLESTEROL: 52 MG/DL
HEMATOCRIT: 44.4 % (ref 35–45)
HEMOGLOBIN A1C: 6 % OF TOTAL HGB
HEMOGLOBIN: 14.4 G/DL (ref 11.7–15.5)
LDL-CHOLESTEROL: 72 MG/DL (CALC)
LYMPHOCYTES: 32.9 %
MCH: 29.9 PG (ref 27–33)
MCHC: 32.4 G/DL (ref 32–36)
MCV: 92.3 FL (ref 80–100)
MONOCYTES: 6.8 %
MPV: 9.9 FL (ref 7.5–12.5)
NEUTROPHILS: 58.4 %
NON-HDL CHOLESTEROL: 99 MG/DL (CALC)
PLATELET COUNT: 238 THOUSAND/UL (ref 140–400)
POTASSIUM: 4.4 MMOL/L (ref 3.5–5.3)
PROTEIN, TOTAL: 7 G/DL (ref 6.1–8.1)
RDW: 12.2 % (ref 11–15)
RED BLOOD CELL COUNT: 4.81 MILLION/UL (ref 3.8–5.1)
SODIUM: 142 MMOL/L (ref 135–146)
T4, FREE: 1.5 NG/DL (ref 0.8–1.8)
TRIGLYCERIDES: 197 MG/DL
TSH: 3.9 MIU/L (ref 0.4–4.5)
WHITE BLOOD CELL COUNT: 6.8 THOUSAND/UL (ref 3.8–10.8)

## 2022-11-19 NOTE — ASSESSMENT & PLAN NOTE
Stable, Continue present management.     Thyroid  (most recent labs)   Lab Results   Component Value Date/Time    TSH 3.90 11/10/2022 05:45 AM    T4F 1.5 11/10/2022 05:45 AM    TSHT4 7.54 (H) 12/14/2020 06:18 AM         Endocrine Medications          levothyroxine 25 MCG Oral Tab

## 2022-11-19 NOTE — ASSESSMENT & PLAN NOTE
As for her Diabetes, it is well controlled, no significant medication side effects noted. Recommendations are: continue present meds, lose weight by increased dietary compliance and exercise and will check labs as ordered.     Lab Results   Component Value Date    A1C 6.0 (H) 11/10/2022    A1C 6.2 (H) 08/25/2021          Continue diet control and follow up in 6 months

## 2022-11-21 ENCOUNTER — OFFICE VISIT (OUTPATIENT)
Dept: FAMILY MEDICINE CLINIC | Facility: CLINIC | Age: 62
End: 2022-11-21
Payer: COMMERCIAL

## 2022-11-21 VITALS
SYSTOLIC BLOOD PRESSURE: 120 MMHG | BODY MASS INDEX: 23.46 KG/M2 | RESPIRATION RATE: 18 BRPM | HEIGHT: 63 IN | DIASTOLIC BLOOD PRESSURE: 74 MMHG | HEART RATE: 78 BPM | WEIGHT: 132.38 LBS

## 2022-11-21 DIAGNOSIS — E03.9 ACQUIRED HYPOTHYROIDISM: ICD-10-CM

## 2022-11-21 DIAGNOSIS — Z00.00 ANNUAL PHYSICAL EXAM: Primary | ICD-10-CM

## 2022-11-21 DIAGNOSIS — E78.2 MIXED HYPERLIPIDEMIA: ICD-10-CM

## 2022-11-21 DIAGNOSIS — E11.9 DIET-CONTROLLED TYPE 2 DIABETES MELLITUS (HCC): ICD-10-CM

## 2022-11-21 DIAGNOSIS — I10 ESSENTIAL HYPERTENSION: ICD-10-CM

## 2022-11-21 PROCEDURE — 90750 HZV VACC RECOMBINANT IM: CPT | Performed by: FAMILY MEDICINE

## 2022-11-21 PROCEDURE — 3074F SYST BP LT 130 MM HG: CPT | Performed by: FAMILY MEDICINE

## 2022-11-21 PROCEDURE — 99396 PREV VISIT EST AGE 40-64: CPT | Performed by: FAMILY MEDICINE

## 2022-11-21 PROCEDURE — 90686 IIV4 VACC NO PRSV 0.5 ML IM: CPT | Performed by: FAMILY MEDICINE

## 2022-11-21 PROCEDURE — 3008F BODY MASS INDEX DOCD: CPT | Performed by: FAMILY MEDICINE

## 2022-11-21 PROCEDURE — 90472 IMMUNIZATION ADMIN EACH ADD: CPT | Performed by: FAMILY MEDICINE

## 2022-11-21 PROCEDURE — 3078F DIAST BP <80 MM HG: CPT | Performed by: FAMILY MEDICINE

## 2022-11-21 PROCEDURE — 90471 IMMUNIZATION ADMIN: CPT | Performed by: FAMILY MEDICINE

## 2022-11-21 RX ORDER — DIAPER,BRIEF,INFANT-TODD,DISP
1 EACH MISCELLANEOUS 2 TIMES DAILY
Qty: 1 EACH | Refills: 0 | COMMUNITY
Start: 2022-11-21

## 2023-03-18 DIAGNOSIS — E03.9 ACQUIRED HYPOTHYROIDISM: ICD-10-CM

## 2023-03-18 RX ORDER — LEVOTHYROXINE SODIUM 25 MCG
TABLET ORAL
Qty: 135 TABLET | Refills: 1 | Status: SHIPPED | OUTPATIENT
Start: 2023-03-18

## 2023-03-23 DIAGNOSIS — E78.2 MIXED HYPERLIPIDEMIA: ICD-10-CM

## 2023-03-27 RX ORDER — ATORVASTATIN CALCIUM 20 MG/1
TABLET, FILM COATED ORAL
Qty: 90 TABLET | Refills: 3 | Status: SHIPPED | OUTPATIENT
Start: 2023-03-27

## 2023-08-27 ENCOUNTER — HOSPITAL ENCOUNTER (EMERGENCY)
Facility: HOSPITAL | Age: 63
Discharge: HOME | End: 2023-08-27
Attending: EMERGENCY MEDICINE
Payer: COMMERCIAL

## 2023-08-27 ENCOUNTER — APPOINTMENT (OUTPATIENT)
Dept: GENERAL RADIOLOGY | Facility: HOSPITAL | Age: 63
End: 2023-08-27
Attending: EMERGENCY MEDICINE
Payer: COMMERCIAL

## 2023-08-27 VITALS
WEIGHT: 135 LBS | BODY MASS INDEX: 24 KG/M2 | HEART RATE: 55 BPM | RESPIRATION RATE: 18 BRPM | TEMPERATURE: 98 F | OXYGEN SATURATION: 98 % | SYSTOLIC BLOOD PRESSURE: 160 MMHG | DIASTOLIC BLOOD PRESSURE: 78 MMHG

## 2023-08-27 DIAGNOSIS — S68.119A AMPUTATION OF FINGER TIP, INITIAL ENCOUNTER: Primary | ICD-10-CM

## 2023-08-27 PROCEDURE — 99284 EMERGENCY DEPT VISIT MOD MDM: CPT

## 2023-08-27 PROCEDURE — 73140 X-RAY EXAM OF FINGER(S): CPT | Performed by: EMERGENCY MEDICINE

## 2023-08-27 PROCEDURE — 90471 IMMUNIZATION ADMIN: CPT

## 2023-08-27 RX ORDER — HYDROCODONE BITARTRATE AND ACETAMINOPHEN 5; 325 MG/1; MG/1
1 TABLET ORAL EVERY 6 HOURS PRN
Qty: 10 TABLET | Refills: 0 | Status: SHIPPED | OUTPATIENT
Start: 2023-08-27 | End: 2023-09-01

## 2023-08-27 RX ORDER — HYDROCODONE BITARTRATE AND ACETAMINOPHEN 5; 325 MG/1; MG/1
1 TABLET ORAL EVERY 6 HOURS PRN
Qty: 10 TABLET | Refills: 0 | Status: SHIPPED | OUTPATIENT
Start: 2023-08-27 | End: 2023-08-27

## 2023-08-27 RX ORDER — TRANEXAMIC ACID 100 MG/ML
5 INJECTION, SOLUTION INTRAVENOUS ONCE
Status: COMPLETED | OUTPATIENT
Start: 2023-08-27 | End: 2023-08-27

## 2023-08-28 ENCOUNTER — TELEPHONE (OUTPATIENT)
Dept: ORTHOPEDICS CLINIC | Facility: CLINIC | Age: 63
End: 2023-08-28

## 2023-08-28 NOTE — TELEPHONE ENCOUNTER
Call[facility] patient and [facility] [place]. She verbalized understanding.

## 2023-08-28 NOTE — TELEPHONE ENCOUNTER
Can you see? How soon? Cut her 2nd right finger tip off when cutting an apple  DOI:08/27/23  Large soft tissue defect along the 2nd distal phalanx. Overlying bandage material obscures fine osseous detail. No appreciable fracture or traumatic malalignment. 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 No radiopaque foreign body.

## 2023-10-07 ENCOUNTER — HOSPITAL ENCOUNTER (OUTPATIENT)
Dept: MAMMOGRAPHY | Age: 63
Discharge: HOME | End: 2023-10-07
Attending: FAMILY MEDICINE
Payer: COMMERCIAL

## 2023-10-07 ENCOUNTER — TELEPHONE (OUTPATIENT)
Dept: FAMILY MEDICINE CLINIC | Facility: CLINIC | Age: 63
End: 2023-10-07

## 2023-10-07 DIAGNOSIS — I10 ESSENTIAL HYPERTENSION: ICD-10-CM

## 2023-10-07 DIAGNOSIS — Z13.29 SCREENING FOR THYROID DISORDER: ICD-10-CM

## 2023-10-07 DIAGNOSIS — E07.9 THYROID DISORDER: ICD-10-CM

## 2023-10-07 DIAGNOSIS — Z13.6 SCREENING FOR CARDIOVASCULAR CONDITION: ICD-10-CM

## 2023-10-07 DIAGNOSIS — Z12.31 VISIT FOR SCREENING MAMMOGRAM: ICD-10-CM

## 2023-10-07 DIAGNOSIS — N18.1 CKD (CHRONIC KIDNEY DISEASE) STAGE 1, GFR 90 ML/MIN OR GREATER: ICD-10-CM

## 2023-10-07 DIAGNOSIS — E78.5 DYSLIPIDEMIA: ICD-10-CM

## 2023-10-07 DIAGNOSIS — Z13.0 SCREENING FOR IRON DEFICIENCY ANEMIA: ICD-10-CM

## 2023-10-07 DIAGNOSIS — E11.9 DIET-CONTROLLED TYPE 2 DIABETES MELLITUS (HCC): ICD-10-CM

## 2023-10-07 DIAGNOSIS — R73.9 HYPERGLYCEMIA: ICD-10-CM

## 2023-10-07 DIAGNOSIS — Z13.1 SCREENING FOR DIABETES MELLITUS: Primary | ICD-10-CM

## 2023-10-07 DIAGNOSIS — Z00.00 LABORATORY EXAMINATION ORDERED AS PART OF A ROUTINE GENERAL MEDICAL EXAMINATION: ICD-10-CM

## 2023-10-07 PROCEDURE — 77063 BREAST TOMOSYNTHESIS BI: CPT | Performed by: FAMILY MEDICINE

## 2023-10-07 PROCEDURE — 77067 SCR MAMMO BI INCL CAD: CPT | Performed by: FAMILY MEDICINE

## 2023-10-07 NOTE — TELEPHONE ENCOUNTER
Please enter lab orders for the patient's upcoming physical appointment. Physical scheduled: Your appointments       Date & Time Appointment Department [facility])    Dec 04, 2023  6:15 PM CST Adult Physical with Siria Willson MD [address] [place] [address] ([address])    PLEASE NOTE - Most insurances allow a Complete Physical once every 366 days. Please schedule accordingly. Please arrive 15 minutes prior to your scheduled appointment. Please also bring your Insurance card, Photo ID, and your medication bottles or a list of your current medication. If you no longer require this appointment, please contact your physician office to cancel. Marco Mendez [id number] [facility] [phone]           Preferred lab: [facility]     The patient has been notified to complete fasting labs prior to their physical appointment.

## 2023-10-08 NOTE — TELEPHONE ENCOUNTER
1. Screening for diabetes mellitus (Primary)  -     Comp Metabolic Panel (14)  2. Diet-controlled type 2 diabetes mellitus ([facility] [address]  Overview:  Dx 2014, better on therapeutic lifestyle  3. Screening for iron deficiency anemia  -     CBC With Differential With Platelet  4. Screening for thyroid disorder  -     TSH W Reflex To Free T4  5. Screening for cardiovascular condition  -     Lipid Panel  6. Laboratory examination ordered as part of a routine general medical examination  -     TSH W Reflex To Free T4  -     Lipid Panel  -     CBC With Differential With Platelet  -     Comp Metabolic Panel (14)  -     Hemoglobin A1C  7. Hyperglycemia  -     Comp Metabolic Panel (14)  -     Hemoglobin A1C  8. Dyslipidemia  -     TSH W Reflex To Free T4  -     Lipid Panel  9. CKD (chronic kidney disease) stage 1, GFR 90 ml/min or greater  -     Microalb/Creat Ratio, Random Urine  10. Thyroid disorder  -     TSH W Reflex To Free T4  11. Essential hypertension  Overview:  Losartan 50 recommended 5/13/2022 but never started. Lost 10 lb with good benefit  Orders:  -     TSH W Reflex To Free T4  -     CBC With Differential With Platelet  -     Comp Metabolic Panel (14)       OK to notify.  Thanks, Agustin Bello MD

## 2023-10-15 DIAGNOSIS — E03.9 ACQUIRED HYPOTHYROIDISM: ICD-10-CM

## 2023-10-16 RX ORDER — LEVOTHYROXINE SODIUM 25 MCG
TABLET ORAL
Qty: 135 TABLET | Refills: 1 | Status: SHIPPED | OUTPATIENT
Start: 2023-10-16

## 2023-10-16 NOTE — TELEPHONE ENCOUNTER
Requested Prescriptions     Pending Prescriptions Disp Refills    SYNTHROID 25 MCG Oral Tab [Pharmacy Med Name: SYNTHROID TAB 25MCG] 135 tablet 1     Sig: TAKE 1 TABLET EVERY OTHER  DAY ALTERNATING WITH 2     TABLETS EVERY   OTHER    DAY(NEW DOSE)     LOV 11/21/2022     Patient was asked to follow-up in: 6 months    Appointment scheduled: 12/4/2023 Otf Israel MD     Medication refilled per protocol.

## 2023-11-30 PROCEDURE — 3061F NEG MICROALBUMINURIA REV: CPT | Performed by: FAMILY MEDICINE

## 2023-11-30 PROCEDURE — 3044F HG A1C LEVEL LT 7.0%: CPT | Performed by: FAMILY MEDICINE

## 2023-12-01 LAB
ABSOLUTE BASOPHILS: 49 CELLS/UL (ref 0–200)
ABSOLUTE EOSINOPHILS: 61 CELLS/UL (ref 15–500)
ABSOLUTE LYMPHOCYTES: 1678 CELLS/UL (ref 850–3900)
ABSOLUTE MONOCYTES: 427 CELLS/UL (ref 200–950)
ABSOLUTE NEUTROPHILS: 3886 CELLS/UL (ref 1500–7800)
ALBUMIN/GLOBULIN RATIO: 1.8 (CALC) (ref 1–2.5)
ALBUMIN: 4.3 G/DL (ref 3.6–5.1)
ALKALINE PHOSPHATASE: 79 U/L (ref 37–153)
ALT: 52 U/L (ref 6–29)
AST: 28 U/L (ref 10–35)
BASOPHILS: 0.8 %
BILIRUBIN, TOTAL: 0.7 MG/DL (ref 0.2–1.2)
BUN: 13 MG/DL (ref 7–25)
CALCIUM: 9.3 MG/DL (ref 8.6–10.4)
CARBON DIOXIDE: 29 MMOL/L (ref 20–32)
CHLORIDE: 105 MMOL/L (ref 98–110)
CHOL/HDLC RATIO: 4 (CALC)
CHOLESTEROL, TOTAL: 205 MG/DL
CREATININE, RANDOM URINE: 37 MG/DL (ref 20–275)
CREATININE: 0.63 MG/DL (ref 0.5–1.05)
EGFR: 101 ML/MIN/1.73M2
EOSINOPHILS: 1 %
GLOBULIN: 2.4 G/DL (CALC) (ref 1.9–3.7)
GLUCOSE: 137 MG/DL (ref 65–99)
HDL CHOLESTEROL: 51 MG/DL
HEMATOCRIT: 43.5 % (ref 35–45)
HEMOGLOBIN A1C: 6 % OF TOTAL HGB
HEMOGLOBIN: 14.1 G/DL (ref 11.7–15.5)
LYMPHOCYTES: 27.5 %
MCH: 30.2 PG (ref 27–33)
MCHC: 32.4 G/DL (ref 32–36)
MCV: 93.1 FL (ref 80–100)
MICROALBUMIN/CREATININE RATIO, RANDOM URINE: 14 MCG/MG CREAT
MICROALBUMIN: 0.5 MG/DL
MONOCYTES: 7 %
MPV: 9.8 FL (ref 7.5–12.5)
NEUTROPHILS: 63.7 %
NON-HDL CHOLESTEROL: 154 MG/DL (CALC)
PLATELET COUNT: 229 THOUSAND/UL (ref 140–400)
POTASSIUM: 4.4 MMOL/L (ref 3.5–5.3)
PROTEIN, TOTAL: 6.7 G/DL (ref 6.1–8.1)
RDW: 12.9 % (ref 11–15)
RED BLOOD CELL COUNT: 4.67 MILLION/UL (ref 3.8–5.1)
SODIUM: 141 MMOL/L (ref 135–146)
TRIGLYCERIDES: 431 MG/DL
TSH W/REFLEX TO FT4: 4 MIU/L (ref 0.4–4.5)
WHITE BLOOD CELL COUNT: 6.1 THOUSAND/UL (ref 3.8–10.8)

## 2023-12-02 NOTE — ASSESSMENT & PLAN NOTE
Cholesterol shows Good control. Long term heart-healthy diet and lifestyle discussed and encouraged to reduce risk of cardiovascular disease. Cholesterol: 205, done on 11/30/2023. HDL Cholesterol: 51, done on 11/30/2023. TriGlycerides 431, done on 11/30/2023. LDL Cholesterol: 72, done on 11/10/2022. Cholesterol medications include ATORVASTATIN 20 MG Oral Tab [[id number]].

## 2023-12-02 NOTE — ASSESSMENT & PLAN NOTE
A1c is 6 done 11/30/2023 which shows Excellent control. Continue current meds and lifestyle modification. Not currently on Diabetic meds.

## 2023-12-02 NOTE — ASSESSMENT & PLAN NOTE
Thyroid shows Good control. TSH: 4, done on 11/30/2023. FT4: 1.5, done on 11/10/2022. Thyroid therapy includes SYNTHROID 25 MCG Oral Tab [id number].

## 2023-12-04 ENCOUNTER — OFFICE VISIT (OUTPATIENT)
Dept: FAMILY MEDICINE CLINIC | Facility: CLINIC | Age: 63
End: 2023-12-04
Payer: COMMERCIAL

## 2023-12-04 VITALS
SYSTOLIC BLOOD PRESSURE: 126 MMHG | BODY MASS INDEX: 24.06 KG/M2 | RESPIRATION RATE: 12 BRPM | DIASTOLIC BLOOD PRESSURE: 86 MMHG | WEIGHT: 135.81 LBS | HEART RATE: 74 BPM | HEIGHT: 63 IN

## 2023-12-04 DIAGNOSIS — E03.9 ACQUIRED HYPOTHYROIDISM: ICD-10-CM

## 2023-12-04 DIAGNOSIS — I10 ESSENTIAL HYPERTENSION: ICD-10-CM

## 2023-12-04 DIAGNOSIS — Z00.00 ANNUAL PHYSICAL EXAM: Primary | ICD-10-CM

## 2023-12-04 DIAGNOSIS — E78.2 MIXED HYPERLIPIDEMIA: ICD-10-CM

## 2023-12-04 DIAGNOSIS — E11.9 DIET-CONTROLLED TYPE 2 DIABETES MELLITUS (HCC): ICD-10-CM

## 2023-12-04 PROCEDURE — 3072F LOW RISK FOR RETINOPATHY: CPT | Performed by: FAMILY MEDICINE

## 2023-12-04 PROCEDURE — 90471 IMMUNIZATION ADMIN: CPT | Performed by: FAMILY MEDICINE

## 2023-12-04 PROCEDURE — 3074F SYST BP LT 130 MM HG: CPT | Performed by: FAMILY MEDICINE

## 2023-12-04 PROCEDURE — 90472 IMMUNIZATION ADMIN EACH ADD: CPT | Performed by: FAMILY MEDICINE

## 2023-12-04 PROCEDURE — 90677 PCV20 VACCINE IM: CPT | Performed by: FAMILY MEDICINE

## 2023-12-04 PROCEDURE — 3079F DIAST BP 80-89 MM HG: CPT | Performed by: FAMILY MEDICINE

## 2023-12-04 PROCEDURE — 3008F BODY MASS INDEX DOCD: CPT | Performed by: FAMILY MEDICINE

## 2023-12-04 PROCEDURE — 99396 PREV VISIT EST AGE 40-64: CPT | Performed by: FAMILY MEDICINE

## 2023-12-04 PROCEDURE — 90686 IIV4 VACC NO PRSV 0.5 ML IM: CPT | Performed by: FAMILY MEDICINE

## 2024-01-13 ENCOUNTER — PATIENT MESSAGE (OUTPATIENT)
Dept: FAMILY MEDICINE CLINIC | Facility: CLINIC | Age: 64
End: 2024-01-13

## 2024-01-15 NOTE — TELEPHONE ENCOUNTER
From: Jeane Ferrara  To: Caleb Rivero  Sent: 1/13/2024 4:46 PM CST  Subject: Vaccinations    FYI- I have received RSV and Covid vaccinations at [facility] on 10Dec2023

## 2024-03-23 DIAGNOSIS — E78.2 MIXED HYPERLIPIDEMIA: ICD-10-CM

## 2024-03-26 RX ORDER — ATORVASTATIN CALCIUM 20 MG/1
TABLET, FILM COATED ORAL
Qty: 90 TABLET | Refills: 0 | Status: SHIPPED | OUTPATIENT
Start: 2024-03-26

## 2024-03-26 NOTE — TELEPHONE ENCOUNTER
Requested Prescriptions     Pending Prescriptions Disp Refills    ATORVASTATIN 20 MG Oral Tab [Pharmacy Med Name: ATORVASTATIN TAB 20MG] 90 tablet 3     Sig: TAKE 1 TABLET DAILY     LOV 12/4/2023     Patient was asked to follow-up in:     Appointment scheduled: 6/7/2024 Caleb Rivero MD     Medication refilled per protocol.

## 2024-05-03 NOTE — TELEPHONE ENCOUNTER
1. Visit for screening mammogram  -     [facility] SCREENING BILAT (CPT=77067); Future; Expected date: 11/10/2022  2. Laboratory examination ordered as part of a routine general medical examination  -     Comp Metabolic Panel (14)  -     Lipid Panel  -     TSH W Reflex To Free T4  -     CBC, Platelet; No Differential  3. Hyperglycemia  -     Hemoglobin A1C       OK to notify.  Thanks, Anastaisa Teran MD
Please enter lab orders for the patient's upcoming physical appointment. Physical scheduled: Your appointments     Date & Time Appointment Department [facility])    Nov 21, 2022  2:45 PM CST Adult Physical with MD Dea Pate [address], [address], [place]  ([address])    PLEASE NOTE - Most insurances allow a Complete Physical once every 366 days. Please schedule accordingly. Please arrive 15 minutes prior to your scheduled appointment. Please also bring your Insurance card, Photo ID, and your medication bottles or a list of your current medication. If you no longer require this appointment, please contact your physician office to cancel. Dallin Morfin vero [id number] [facility] [phone]         Preferred lab: [facility]     The patient has been notified to complete fasting labs prior to their physical appointment.
General (Pediatric)

## 2024-05-16 DIAGNOSIS — E03.9 ACQUIRED HYPOTHYROIDISM: ICD-10-CM

## 2024-05-20 RX ORDER — LEVOTHYROXINE SODIUM 25 MCG
TABLET ORAL
Qty: 135 TABLET | Refills: 1 | Status: SHIPPED | OUTPATIENT
Start: 2024-05-20

## 2024-05-20 NOTE — TELEPHONE ENCOUNTER
Requested Prescriptions     Pending Prescriptions Disp Refills    SYNTHROID 25 MCG Oral Tab [Pharmacy Med Name: SYNTHROID TAB 25MCG] 135 tablet 1     Sig: TAKE 1 TABLET EVERY OTHER  DAY ALTERNATING WITH 2     TABLETS EVERY   OTHER    DAY(NEW DOSE)     LOV 12/4/2023     Patient was asked to follow-up in: 6 months    Appointment scheduled: 6/7/2024 Caleb Rivero MD     Medication refilled per protocol.

## 2024-06-07 ENCOUNTER — OFFICE VISIT (OUTPATIENT)
Dept: FAMILY MEDICINE CLINIC | Facility: CLINIC | Age: 64
End: 2024-06-07
Payer: COMMERCIAL

## 2024-06-07 VITALS
RESPIRATION RATE: 16 BRPM | BODY MASS INDEX: 24.39 KG/M2 | HEIGHT: 63 IN | WEIGHT: 137.63 LBS | SYSTOLIC BLOOD PRESSURE: 134 MMHG | HEART RATE: 68 BPM | DIASTOLIC BLOOD PRESSURE: 86 MMHG

## 2024-06-07 DIAGNOSIS — I10 ESSENTIAL HYPERTENSION: ICD-10-CM

## 2024-06-07 DIAGNOSIS — E11.9 DIET-CONTROLLED TYPE 2 DIABETES MELLITUS (HCC): Primary | ICD-10-CM

## 2024-06-07 DIAGNOSIS — R06.83 SNORING: ICD-10-CM

## 2024-06-07 DIAGNOSIS — E03.9 ACQUIRED HYPOTHYROIDISM: ICD-10-CM

## 2024-06-07 DIAGNOSIS — E78.2 MIXED HYPERLIPIDEMIA: ICD-10-CM

## 2024-06-07 PROCEDURE — 99214 OFFICE O/P EST MOD 30 MIN: CPT | Performed by: FAMILY MEDICINE

## 2024-06-07 PROCEDURE — 3044F HG A1C LEVEL LT 7.0%: CPT | Performed by: FAMILY MEDICINE

## 2024-06-07 PROCEDURE — 3061F NEG MICROALBUMINURIA REV: CPT | Performed by: FAMILY MEDICINE

## 2024-06-07 PROCEDURE — 3075F SYST BP GE 130 - 139MM HG: CPT | Performed by: FAMILY MEDICINE

## 2024-06-07 PROCEDURE — 3079F DIAST BP 80-89 MM HG: CPT | Performed by: FAMILY MEDICINE

## 2024-06-07 PROCEDURE — 3008F BODY MASS INDEX DOCD: CPT | Performed by: FAMILY MEDICINE

## 2024-06-07 PROCEDURE — G2211 COMPLEX E/M VISIT ADD ON: HCPCS | Performed by: FAMILY MEDICINE

## 2024-06-07 RX ORDER — ORAL SEMAGLUTIDE 3 MG/1
3 TABLET ORAL
Qty: 30 TABLET | Refills: 0 | COMMUNITY
Start: 2024-06-07 | End: 2024-07-07

## 2024-06-07 RX ORDER — LEVOTHYROXINE SODIUM 0.03 MG/1
TABLET ORAL
Qty: 160 TABLET | Refills: 3 | Status: SHIPPED | OUTPATIENT
Start: 2024-06-07

## 2024-06-07 NOTE — ASSESSMENT & PLAN NOTE
A1c is 6.4 done 6/4/2024 which shows Excellent control. Continue current meds and lifestyle modification.  Not currently on Diabetic meds.

## 2024-06-07 NOTE — ASSESSMENT & PLAN NOTE
Last K was 4.4 done on 6/4/2024.  Last Cr was 0.64 done on 6/4/2024.  Last eGFR was 100 on 6/4/2024.

## 2024-06-07 NOTE — ASSESSMENT & PLAN NOTE
Cholesterol shows Good control. Long term heart-healthy diet and lifestyle discussed and encouraged to reduce risk of cardiovascular disease.  Cholesterol: 166, done on 6/4/2024.  HDL Cholesterol: 49, done on 6/4/2024.  TriGlycerides 330, done on 6/4/2024.  LDL Cholesterol: 77, done on 6/4/2024.   Cholesterol medications include atorvastatin 20 MG Oral Tab [[id number]], atorvastatin 20 MG Oral Tab [[id number]] (Long-Term Med).

## 2024-06-07 NOTE — PROGRESS NOTES
had concerns including Diabetes (6 months follow up ).   Jeane Ferrara is a 64 year old female who presents for recheck of her diabetes.  Subjective:   Dogin well overall. Had COVID in January, with mild cough sukhdev abhishek for months.   Last A1c value was 6.4% done 6/4/2024.   Low thyroid with increased TSH on 25 alt with 50 .   Health Maintenance Due   Topic Date Due    Annual Depression Screening  01/01/2024        Objective:    Labs, meds and PMSFHx reviewed, see Reviewed tab in Encounter     Wt Readings from Last 3 Encounters:   06/07/24 137 lb 9.6 oz (62.4 kg)   12/04/23 135 lb 12.8 oz (61.6 kg)   08/27/23 135 lb (61.2 kg)     BP Readings from Last 3 Encounters:   06/07/24 134/86   12/04/23 126/86   08/27/23 160/78         HPI     Review of Systems   Constitutional: Negative.  Negative for fatigue, fever and unexpected weight change.   HENT: Negative.     Eyes: Negative.    Respiratory: Negative.     Cardiovascular: Negative.    Gastrointestinal: Negative.  Negative for nausea and vomiting.   Endocrine: Negative.  Negative for polydipsia, polyphagia and polyuria.   Genitourinary: Negative.    Musculoskeletal: Negative.  Negative for neck pain.   Skin: Negative.    Neurological: Negative.    Psychiatric/Behavioral: Negative.     All other systems reviewed and are negative.       /86   Pulse 68   Resp 16   Ht 5' 3\" (1.6 m)   Wt 137 lb 9.6 oz (62.4 kg)   LMP 06/08/2010   BMI 24.37 kg/m²  Estimated body mass index is 24.37 kg/m² as calculated from the following:    Height as of this encounter: 5' 3\" (1.6 m).    Weight as of this encounter: 137 lb 9.6 oz (62.4 kg).   Physical Exam  Vitals and nursing note reviewed.   Constitutional:       General: She is not in acute distress.     Appearance: Normal appearance. She is well-developed.   HENT:      Head: Normocephalic and atraumatic.   Cardiovascular:      Rate and Rhythm: Normal rate and regular rhythm.      Pulses:           Posterior tibial pulses are 2+ on  the right side and 2+ on the left side.      Heart sounds: Normal heart sounds. No murmur heard.  Pulmonary:      Effort: Pulmonary effort is normal. No respiratory distress.      Breath sounds: Normal breath sounds. No wheezing.   Abdominal:      General: Bowel sounds are normal.      Palpations: Abdomen is soft.      Tenderness: There is no abdominal tenderness.   Musculoskeletal:         General: Normal range of motion.      Cervical back: Normal range of motion.      Right lower leg: No edema.      Left lower leg: No edema.      Right foot: No deformity.      Left foot: No deformity.   Feet:      Right foot:      Protective Sensation: 6 sites tested.  6 sites sensed.      Skin integrity: Skin integrity normal. No ulcer.      Left foot:      Protective Sensation: 6 sites tested.  6 sites sensed.      Skin integrity: Skin integrity normal. No ulcer.   Skin:     General: Skin is warm and dry.      Findings: No rash.   Neurological:      Mental Status: She is alert and oriented to person, place, and time.   Psychiatric:         Mood and Affect: Mood normal.         Behavior: Behavior normal.         Thought Content: Thought content normal.         Judgment: Judgment normal.        Nursing note and vitals reviewed.       Assessment & Plan:    The patient indicates understanding of these issues and agrees to the plan.    1. Diet-controlled type 2 diabetes mellitus (HCC) (Primary)  Overview:  Dx 2014, better on therapeutic lifestyle  Assessment & Plan:  A1c is 6.4 done 6/4/2024 which shows Excellent control. Continue current meds and lifestyle modification.  Not currently on Diabetic meds.   Orders:  -     Rybelsus; Take 3 mg by mouth every morning before breakfast.  Dispense: 30 tablet; Refill: 0  2. Mixed hyperlipidemia  Overview:  Atorvastatin 20  Assessment & Plan:  Cholesterol shows Good control. Long term heart-healthy diet and lifestyle discussed and encouraged to reduce risk of cardiovascular  disease.  Cholesterol: 166, done on 6/4/2024.  HDL Cholesterol: 49, done on 6/4/2024.  TriGlycerides 330, done on 6/4/2024.  LDL Cholesterol: 77, done on 6/4/2024.   Cholesterol medications include atorvastatin 20 MG Oral Tab [[id number]], atorvastatin 20 MG Oral Tab [[id number]] (Long-Term Med).     3. Essential hypertension  Overview:  Losartan 50 recommended 5/13/2022 but never started. Lost 10 lb with good benefit  Assessment & Plan:  Last K was 4.4 done on 6/4/2024.  Last Cr was 0.64 done on 6/4/2024.  Last eGFR was 100 on 6/4/2024.   4. Acquired hypothyroidism  Overview:  Dx 11/15/2019 with TSH 4.77 in 2017 and 7.3 in 11/2019, Levothyroxine 50 started fall 2020  Assessment & Plan:  Thyroid shows Good control.   TSH: 5.1, done on 6/4/2024.  FT4: 1.3, done on 6/4/2024.   Thyroid therapy includes SYNTHROID 25 MCG Oral Tab [[id number]], SYNTHROID 25 MCG Oral Tab [[id number]] (Long-Term Med).   Orders:  -     Levothyroxine Sodium; 25 mcg every day, extra 25 mcg (50 mcg total) 5  times weekly.  Dispense: 160 tablet; Refill: 3  5. Snoring  -     Home Sleep Apnea Test (Adult pt only) - Sleep consult required for Medicare pts  -     General sleep study; Future; Expected date: 07/07/2024  Overall doing well but A1c is up a little bit, will try Rybelsus, she is reluctant so I gave her sample 38 pack and she will get back to me but would be okay if it took her a while to get on it.  May help with diet and we discussed risks and benefits of medication.  I have changed Ajay Ferrara's Synthroid to levothyroxine. I am also having her start on Rybelsus. Additionally, I am having her maintain her cholecalciferol, aspirin, and atorvastatin.     Return in about 6 months (around 12/7/2024) for Annual physical, diabetes follow up.

## 2024-06-07 NOTE — ASSESSMENT & PLAN NOTE
Thyroid shows Good control.   TSH: 5.1, done on 6/4/2024.  FT4: 1.3, done on 6/4/2024.   Thyroid therapy includes SYNTHROID 25 MCG Oral Tab [[id number]], SYNTHROID 25 MCG Oral Tab [[id number]] (Long-Term Med).

## 2024-06-14 DIAGNOSIS — E78.2 MIXED HYPERLIPIDEMIA: ICD-10-CM

## 2024-06-14 RX ORDER — ATORVASTATIN CALCIUM 20 MG/1
TABLET, FILM COATED ORAL
Qty: 90 TABLET | Refills: 0 | Status: SHIPPED | OUTPATIENT
Start: 2024-06-14

## 2024-06-14 NOTE — TELEPHONE ENCOUNTER
Requested Prescriptions     Pending Prescriptions Disp Refills    ATORVASTATIN 20 MG Oral Tab [Pharmacy Med Name: ATORVASTATIN TAB 20MG] 90 tablet 0     Sig: TAKE 1 TABLET DAILY     LOV 6/7/2024     Patient was asked to follow-up in:     Appointment scheduled: 12/6/2024 Caleb Rivero MD     Medication refilled per protocol.

## 2024-09-26 DIAGNOSIS — E78.2 MIXED HYPERLIPIDEMIA: ICD-10-CM

## 2024-09-26 RX ORDER — ATORVASTATIN CALCIUM 20 MG/1
TABLET, FILM COATED ORAL
Qty: 90 TABLET | Refills: 0 | Status: SHIPPED | OUTPATIENT
Start: 2024-09-26

## 2024-09-26 NOTE — TELEPHONE ENCOUNTER
Requested Prescriptions     Pending Prescriptions Disp Refills    ATORVASTATIN 20 MG Oral Tab [Pharmacy Med Name: ATORVASTATIN TAB 20MG] 90 tablet 0     Sig: TAKE 1 TABLET DAILY     LOV 6/7/2024     Patient was asked to follow-up in: 6 months    Appointment scheduled: 12/6/2024 Caleb Rivero MD     Medication refilled per protocol.

## 2024-11-04 ENCOUNTER — TELEPHONE (OUTPATIENT)
Dept: FAMILY MEDICINE CLINIC | Facility: CLINIC | Age: 64
End: 2024-11-04

## 2024-11-04 DIAGNOSIS — Z13.0 SCREENING FOR IRON DEFICIENCY ANEMIA: ICD-10-CM

## 2024-11-04 DIAGNOSIS — I10 ESSENTIAL HYPERTENSION: ICD-10-CM

## 2024-11-04 DIAGNOSIS — R73.9 HYPERGLYCEMIA: ICD-10-CM

## 2024-11-04 DIAGNOSIS — Z12.31 VISIT FOR SCREENING MAMMOGRAM: Primary | ICD-10-CM

## 2024-11-04 DIAGNOSIS — Z00.00 LABORATORY EXAMINATION ORDERED AS PART OF A ROUTINE GENERAL MEDICAL EXAMINATION: ICD-10-CM

## 2024-11-04 DIAGNOSIS — Z13.1 SCREENING FOR DIABETES MELLITUS: ICD-10-CM

## 2024-11-04 DIAGNOSIS — E55.9 VITAMIN D DEFICIENCY: ICD-10-CM

## 2024-11-04 DIAGNOSIS — E78.5 DYSLIPIDEMIA: ICD-10-CM

## 2024-11-04 DIAGNOSIS — Z13.6 SCREENING FOR CARDIOVASCULAR CONDITION: ICD-10-CM

## 2024-11-04 NOTE — TELEPHONE ENCOUNTER
Please enter lab orders for the patient's upcoming physical appointment.     Physical scheduled:   Your appointments       Date & Time Appointment Department ([place])    Dec 06, 2024 2:45 PM CST Physical - Established with Caleb Rivero MD [facility] ([facility])              [facility]  [address]  [facility] [address] [phone]           Preferred lab: [facility]     The patient has been notified to complete fasting labs prior to their physical appointment.

## 2024-11-04 NOTE — TELEPHONE ENCOUNTER
1. Visit for screening mammogram (Primary)  -     Mammogram Bilateral Screening with 3D; Future; Expected date: 11/04/2024  2. Screening for diabetes mellitus  -     Comp Metabolic Panel (14)  3. Screening for iron deficiency anemia  -     CBC With Differential With Platelet  4. Screening for cardiovascular condition  -     Lipid Panel  5. Laboratory examination ordered as part of a routine general medical examination  -     Lipid Panel  -     CBC With Differential With Platelet  -     Comp Metabolic Panel (14)  -     Hemoglobin A1C  -     Vitamin D, 25-Hydroxy  6. Hyperglycemia  -     Comp Metabolic Panel (14)  -     Hemoglobin A1C  7. Dyslipidemia  -     Lipid Panel  8. Essential hypertension  Overview:  Losartan 50 recommended 5/13/2022 but never started. Lost 10 lb with good benefit  Orders:  -     CBC With Differential With Platelet  -     Comp Metabolic Panel (14)  9. Vitamin D deficiency  -     Vitamin D, 25-Hydroxy       OK to notify. Thanks, Jeff Rivero MD

## 2024-11-04 NOTE — TELEPHONE ENCOUNTER
Please enter lab orders for the patient's upcoming physical appointment.     Physical scheduled:   Your appointments       Date & Time Appointment Department ([place])    Dec 06, 2024 2:45 PM CST Physical - Established with Caleb Rivero MD [facility] ([facility])              [facility]  [address]  [facility] [address] [phone]           Preferred lab: [facility]     The patient has been notified to complete fasting labs prior to their physical appointment.

## 2024-12-03 ENCOUNTER — HOSPITAL ENCOUNTER (OUTPATIENT)
Dept: MAMMOGRAPHY | Age: 64
Discharge: HOME | End: 2024-12-03
Attending: FAMILY MEDICINE
Payer: COMMERCIAL

## 2024-12-03 DIAGNOSIS — Z12.31 VISIT FOR SCREENING MAMMOGRAM: ICD-10-CM

## 2024-12-03 PROCEDURE — 77063 BREAST TOMOSYNTHESIS BI: CPT | Performed by: FAMILY MEDICINE

## 2024-12-03 PROCEDURE — 77067 SCR MAMMO BI INCL CAD: CPT | Performed by: FAMILY MEDICINE

## 2024-12-04 LAB
ABSOLUTE BASOPHILS: 53 CELLS/UL (ref 0–200)
ABSOLUTE EOSINOPHILS: 71 CELLS/UL (ref 15–500)
ABSOLUTE LYMPHOCYTES: 1906 CELLS/UL (ref 850–3900)
ABSOLUTE MONOCYTES: 431 CELLS/UL (ref 200–950)
ABSOLUTE NEUTROPHILS: 3440 CELLS/UL (ref 1500–7800)
ALBUMIN/GLOBULIN RATIO: 1.9 (CALC) (ref 1–2.5)
ALBUMIN: 4.2 G/DL (ref 3.6–5.1)
ALKALINE PHOSPHATASE: 66 U/L (ref 37–153)
ALT: 29 U/L (ref 6–29)
AST: 23 U/L (ref 10–35)
BASOPHILS: 0.9 %
BILIRUBIN, TOTAL: 0.6 MG/DL (ref 0.2–1.2)
BUN: 14 MG/DL (ref 7–25)
CALCIUM: 9.2 MG/DL (ref 8.6–10.4)
CARBON DIOXIDE: 29 MMOL/L (ref 20–32)
CHLORIDE: 105 MMOL/L (ref 98–110)
CHOL/HDLC RATIO: 2.3 (CALC)
CHOLESTEROL, TOTAL: 131 MG/DL
CREATININE: 0.58 MG/DL (ref 0.5–1.05)
EGFR: 102 ML/MIN/1.73M2
EOSINOPHILS: 1.2 %
GLOBULIN: 2.2 G/DL (CALC) (ref 1.9–3.7)
GLUCOSE: 113 MG/DL (ref 65–99)
HDL CHOLESTEROL: 56 MG/DL
HEMATOCRIT: 42.7 % (ref 35–45)
HEMOGLOBIN A1C: 6.4 % OF TOTAL HGB
HEMOGLOBIN: 14.3 G/DL (ref 11.7–15.5)
LDL-CHOLESTEROL: 54 MG/DL (CALC)
LYMPHOCYTES: 32.3 %
MCH: 31.6 PG (ref 27–33)
MCHC: 33.5 G/DL (ref 32–36)
MCV: 94.3 FL (ref 80–100)
MONOCYTES: 7.3 %
MPV: 10 FL (ref 7.5–12.5)
NEUTROPHILS: 58.3 %
NON-HDL CHOLESTEROL: 75 MG/DL (CALC)
PLATELET COUNT: 225 THOUSAND/UL (ref 140–400)
POTASSIUM: 4.6 MMOL/L (ref 3.5–5.3)
PROTEIN, TOTAL: 6.4 G/DL (ref 6.1–8.1)
RDW: 12.5 % (ref 11–15)
RED BLOOD CELL COUNT: 4.53 MILLION/UL (ref 3.8–5.1)
SODIUM: 141 MMOL/L (ref 135–146)
TRIGLYCERIDES: 126 MG/DL
VITAMIN D, 25-OH, TOTAL: 74 NG/ML (ref 30–100)
WHITE BLOOD CELL COUNT: 5.9 THOUSAND/UL (ref 3.8–10.8)

## 2024-12-06 ENCOUNTER — OFFICE VISIT (OUTPATIENT)
Dept: FAMILY MEDICINE CLINIC | Facility: CLINIC | Age: 64
End: 2024-12-06
Payer: COMMERCIAL

## 2024-12-06 VITALS
HEIGHT: 63 IN | SYSTOLIC BLOOD PRESSURE: 124 MMHG | BODY MASS INDEX: 23.39 KG/M2 | DIASTOLIC BLOOD PRESSURE: 78 MMHG | RESPIRATION RATE: 14 BRPM | WEIGHT: 132 LBS | HEART RATE: 78 BPM

## 2024-12-06 DIAGNOSIS — Z12.4 SCREENING FOR CERVICAL CANCER: ICD-10-CM

## 2024-12-06 DIAGNOSIS — E11.9 DIET-CONTROLLED TYPE 2 DIABETES MELLITUS (HCC): ICD-10-CM

## 2024-12-06 DIAGNOSIS — E03.9 ACQUIRED HYPOTHYROIDISM: ICD-10-CM

## 2024-12-06 DIAGNOSIS — Z00.00 ANNUAL PHYSICAL EXAM: Primary | ICD-10-CM

## 2024-12-06 DIAGNOSIS — I10 ESSENTIAL HYPERTENSION: ICD-10-CM

## 2024-12-06 DIAGNOSIS — E78.2 MIXED HYPERLIPIDEMIA: ICD-10-CM

## 2024-12-06 PROCEDURE — 87624 HPV HI-RISK TYP POOLED RSLT: CPT | Performed by: FAMILY MEDICINE

## 2024-12-06 RX ORDER — ATORVASTATIN CALCIUM 20 MG/1
20 TABLET, FILM COATED ORAL DAILY
Qty: 90 TABLET | Refills: 3 | Status: SHIPPED | OUTPATIENT
Start: 2024-12-06

## 2024-12-06 NOTE — ASSESSMENT & PLAN NOTE
Diabetes: A1c is 6.4 done 12/3/2024 which shows Excellent control. Continue current meds and lifestyle modification.   DM Meds: diet controlled      Diabetic Complications:      Diabetes is : stable Continue current treatment regimen. Reassess Diabetes in : in 6 months     Orders:    Comp Metabolic Panel (14)    Hemoglobin A1C    Lipid Panel    Microalb/Creat Ratio, Random Urine

## 2024-12-06 NOTE — ASSESSMENT & PLAN NOTE
Thyroid shows Good control.   11/10/2022: TSH 3.90  Thryoid Meds: levothyroxine Tabs - 25 MCG well controlled current treatment plan effective, no change in therapy   On 50 alt with 25 and labs in 3 moths.   Orders:    TSH and Free T4

## 2024-12-06 NOTE — ASSESSMENT & PLAN NOTE
BP shows good control with last BP of 124/78. Continue lifestyle changes, diet, exercise and weight loss.   12/3/2024: POTASSIUM 4.6; CREATININE 0.58; EGFR 102

## 2024-12-06 NOTE — ASSESSMENT & PLAN NOTE
Cholesterol shows Good control. Long term heart-healthy diet and lifestyle discussed and encouraged to reduce risk of cardiovascular disease.  12/3/2024: CHOLESTEROL, TOTAL 131; HDL CHOLESTEROL 56; TRIGLYCERIDES 126; LDL-CHOLESTEROL 54  Cholesterol Meds: atorvastatin Tabs - 20 MG  stable  Continue with current treatment plan     Orders:    Atorvastatin Calcium; Take 1 tablet (20 mg total) by mouth daily.  Dispense: 90 tablet; Refill: 3

## 2024-12-06 NOTE — PROGRESS NOTES
Jeane Ferrara is a 64 year old female who presents for a complete physical exam.     had concerns including Physical (WWE, last pap was in ).   No topic due editable text   Last A1c value was 6.4% done 12/3/2024.      Subjective:     Symptoms: is menopausal. Patient complains of dogin well. Lasta1 .   Tobacco:  She has never smoked tobacco.       Wt Readings from Last 4 Encounters:   24 132 lb (59.9 kg)   24 137 lb 9.6 oz (62.4 kg)   23 135 lb 12.8 oz (61.6 kg)   23 135 lb (61.2 kg)     Body mass index is 23.38 kg/m².     The 10-year ASCVD risk score (Laura BISHOP, et al., 2019) is: 5.4%    Values used to calculate the score:      Age: 64 years      Sex: Female      Is Non- : No      Diabetic: Yes      Tobacco smoker: No      Systolic Blood Pressure: 124 mmHg      Is BP treated: No      HDL Cholesterol: 56 mg/dL      Total Cholesterol: 131 mg/dL    Chief Complaint Reviewed and Verified  Nursing Notes Reviewed and   Verified  Tobacco Reviewed  Allergies Reviewed  Medications Reviewed    Problem List Reviewed  Medical History Reviewed  Surgical History   Reviewed  OB Status Reviewed  Family History Reviewed          Her family history includes Breast Cancer (age of onset: 60) in her paternal cousin female; Cancer in her father and paternal grandmother; Heart Surgery in her mother; Ovarian Cancer (age of onset: 60) in her paternal grandmother.   She  reports that she has never smoked. She has never used smokeless tobacco. She reports that she does not currently use alcohol. She reports that she does not use drugs.    Exercise: three times per week.  Diet: watches minimally    GYNE HISTORY: Menstrual History:  OB History    Para Term  AB Living   5 2 2 0 3 2   SAB IAB Ectopic Multiple Live Births   3 0 0 0 2      Menarche age:    Patient's last menstrual period was 2010.       Health Maintenance Due   Topic Date Due    Annual Depression  Screening  01/01/2024    Pap Smear  08/30/2024    COVID-19 Vaccine (6 - 2024-25 season) 09/01/2024    Influenza Vaccine (1) 10/01/2024    Annual Physical  12/04/2024         Review of Systems   Constitutional: Negative.  Negative for activity change, appetite change, chills and fever.   HENT: Negative.     Eyes: Negative.    Respiratory: Negative.  Negative for shortness of breath.    Cardiovascular: Negative.  Negative for chest pain and palpitations.   Gastrointestinal: Negative.  Negative for abdominal pain.   Genitourinary: Negative.  Negative for dysuria.   Musculoskeletal:  Negative for arthralgias.   Skin: Negative.  Negative for rash.   Allergic/Immunologic: Negative.    Neurological: Negative.         Results:    Lab Results   Component Value Date/Time    WBC 5.9 12/03/2024 07:07 AM    HGB 14.3 12/03/2024 07:07 AM     12/03/2024 07:07 AM      Lab Results   Component Value Date/Time     (H) 12/03/2024 07:07 AM     12/03/2024 07:07 AM    K 4.6 12/03/2024 07:07 AM     12/03/2024 07:07 AM    CO2 29 12/03/2024 07:07 AM    CREATSERUM 0.58 12/03/2024 07:07 AM    CA 9.2 12/03/2024 07:07 AM    ALB 4.2 12/03/2024 07:07 AM    TP 6.4 12/03/2024 07:07 AM    ALKPHO 66 12/03/2024 07:07 AM    AST 23 12/03/2024 07:07 AM    ALT 29 12/03/2024 07:07 AM    BILT 0.6 12/03/2024 07:07 AM    TSH 3.90 11/10/2022 05:45 AM    T4F 1.3 06/04/2024 06:33 AM        Lab Results   Component Value Date/Time    CHOLEST 131 12/03/2024 07:07 AM    HDL 56 12/03/2024 07:07 AM    TRIG 126 12/03/2024 07:07 AM    LDL 54 12/03/2024 07:07 AM    NONHDLC 75 12/03/2024 07:07 AM       Last A1c value was 6.4% done 12/3/2024.     12/3/2024: VITAMIN D, 25-OH, TOTAL 74       Objective:    EXAM:  /78   Pulse 78   Resp 14   Ht 5' 3\" (1.6 m)   Wt 132 lb (59.9 kg)   LMP 06/08/2010   BMI 23.38 kg/m²  Estimated body mass index is 23.38 kg/m² as calculated from the following:    Height as of this encounter: 5' 3\" (1.6 m).     Weight as of this encounter: 132 lb (59.9 kg).   Physical Exam  Vitals and nursing note reviewed. Exam conducted with a chaperone present.   Constitutional:       General: She is not in acute distress.     Appearance: Normal appearance.   HENT:      Head: Normocephalic and atraumatic.      Right Ear: Tympanic membrane and external ear normal.      Left Ear: Tympanic membrane and external ear normal.      Nose: Nose normal.      Mouth/Throat:      Mouth: Mucous membranes are moist.   Eyes:      Extraocular Movements: Extraocular movements intact.      Pupils: Pupils are equal, round, and reactive to light.   Cardiovascular:      Rate and Rhythm: Normal rate and regular rhythm.      Pulses: Normal pulses.           Carotid pulses are 2+ on the right side and 2+ on the left side.       Radial pulses are 2+ on the right side and 2+ on the left side.        Dorsalis pedis pulses are 2+ on the right side and 2+ on the left side.        Posterior tibial pulses are 2+ on the right side and 2+ on the left side.      Heart sounds: Normal heart sounds, S1 normal and S2 normal. No murmur heard.  Pulmonary:      Effort: Pulmonary effort is normal. No respiratory distress.      Breath sounds: Normal breath sounds. No wheezing.   Chest:      Chest wall: No mass.   Breasts:     Breasts are symmetrical.      Right: No inverted nipple or tenderness.      Left: No inverted nipple or tenderness.   Abdominal:      General: Abdomen is flat. Bowel sounds are normal. There is no distension.      Palpations: Abdomen is soft.      Tenderness: There is no abdominal tenderness.   Genitourinary:     General: Normal vulva.      Exam position: Prone.      Labia:         Right: No rash.         Left: No rash.       Vagina: Normal. No vaginal discharge.      Cervix: No cervical motion tenderness, discharge or friability.      Adnexa:         Right: No mass.          Left: No mass.        Rectum: Normal.   Musculoskeletal:         General: Normal  range of motion.      Cervical back: Normal range of motion and neck supple.      Right lower leg: No edema.      Left lower leg: No edema.      Right foot: No deformity.      Left foot: No deformity.   Feet:      Right foot:      Protective Sensation: 6 sites tested.  6 sites sensed.      Skin integrity: Skin integrity normal. No ulcer.      Left foot:      Protective Sensation: 6 sites tested.  6 sites sensed.      Skin integrity: Skin integrity normal. No ulcer.   Skin:     General: Skin is warm and dry.      Capillary Refill: Capillary refill takes less than 2 seconds.      Findings: No rash.   Neurological:      General: No focal deficit present.      Mental Status: She is alert and oriented to person, place, and time.   Psychiatric:         Mood and Affect: Mood normal.         Behavior: Behavior normal.         Thought Content: Thought content normal.         Judgment: Judgment normal.          Assessment & Plan:    Jeane Ferrara is a 64 year old female who presents for a complete physical exam.   Pt's weight is Body mass index is 23.38 kg/m²., recommended low fat diet and aerobic exercise 30 minutes three times weekly.   Health maintenance, Up to date    Immunizations: Up to date   Immunization History   Administered Date(s) Administered    >= 3 Yrs, FLUZONE, Pres Free (11260), Influenza Vaccine, Flu Clinic 12/17/2013    >=3 YRS TRI  MULTIDOSE VIAL (05792) FLU CLINIC 10/28/2021    Covid-19 Vaccine Pfizer 30 mcg/0.3 ml 03/31/2021, 04/21/2021, 11/29/2021    Covid-19 Vaccine Pfizer Bivalent 30mcg/0.3mL 01/07/2023    FLULAVAL 6 months & older 0.5 ml Prefilled syringe (30444) 11/21/2022, 12/04/2023    FLUZONE 6 months and older PFS 0.5 ml (23829) 10/01/2014, 10/28/2020    Fluarix 6 Months And Older 0.5 ml prefilled syringe (78375) 10/28/2020    Flucelvax 0.5 Ml Quad PFS Single Dose 10/15/2019    Flucelvax Influenza vaccine, trivalent (ccIIV3), 0.5mL IM 10/15/2019    Influenza 10/01/2016, 10/16/2017, 10/29/2018     Influenza Vaccine, trivalent (IIV3), PF 0.5mL (34629) 12/06/2024    Pfizer Covid-19 Vaccine 30mcg/0.3ml 12yrs+ 12/10/2023    Pneumococcal Conjugate PCV20 12/04/2023    Pneumovax 23 08/30/2021    RSV 12/10/2023    RSV, recombinant, RSVpreF, adjuvanted (Arexvy) 12/10/2023    TDAP 10/01/2014, 08/27/2023    Zoster Vaccine Recombinant Adjuvanted (Shingrix) 08/30/2021, 11/21/2022         Pt info given for: exercise, low fat diet, The patient indicates understanding of these issues and agrees to the plan.  The patient is asked to return for CPX in 1 years.    Assessment & Plan  Annual physical exam  Pap doen today       Diet-controlled type 2 diabetes mellitus (HCC)  Diabetes: A1c is 6.4 done 12/3/2024 which shows Excellent control. Continue current meds and lifestyle modification.   DM Meds: diet controlled      Diabetic Complications:      Diabetes is : stable Continue current treatment regimen. Reassess Diabetes in : in 6 months     Orders:    Comp Metabolic Panel (14)    Hemoglobin A1C    Lipid Panel    Microalb/Creat Ratio, Random Urine    Mixed hyperlipidemia  Cholesterol shows Good control. Long term heart-healthy diet and lifestyle discussed and encouraged to reduce risk of cardiovascular disease.  12/3/2024: CHOLESTEROL, TOTAL 131; HDL CHOLESTEROL 56; TRIGLYCERIDES 126; LDL-CHOLESTEROL 54  Cholesterol Meds: atorvastatin Tabs - 20 MG  stable  Continue with current treatment plan     Orders:    Atorvastatin Calcium; Take 1 tablet (20 mg total) by mouth daily.  Dispense: 90 tablet; Refill: 3    Essential hypertension  BP shows good control with last BP of 124/78. Continue lifestyle changes, diet, exercise and weight loss.   12/3/2024: POTASSIUM 4.6; CREATININE 0.58; EGFR 102            Acquired hypothyroidism  Thyroid shows Good control.   11/10/2022: TSH 3.90  Thryoid Meds: levothyroxine Tabs - 25 MCG well controlled current treatment plan effective, no change in therapy   On 50 alt with 25 and labs in 3 moths.    Orders:    TSH and Free T4    Screening for cervical cancer  [place] cervix, no prolaspse.   Orders:    ThinPrep PAP Smear B; Future; Expected date: 12/06/2024    Hpv High Risk , Thin Prep Collect; Future; Expected date: 12/06/2024    ThinPrep PAP Smear B    Hpv High Risk , Thin Prep Collect       I have changed Ajay Ferrara's atorvastatin. I am also having her maintain her cholecalciferol, aspirin, levothyroxine, and Omega-3 Fatty Acids (FISH OIL OR).     Return in about 6 months (around 6/6/2025) for diabetes follow up.

## 2024-12-09 LAB — HPV E6+E7 MRNA CVX QL NAA+PROBE: NEGATIVE

## 2024-12-12 ENCOUNTER — OFFICE VISIT (OUTPATIENT)
Dept: SLEEP CENTER | Age: 64
End: 2024-12-12
Attending: Other
Payer: COMMERCIAL

## 2024-12-12 DIAGNOSIS — R06.83 SNORING: ICD-10-CM

## 2024-12-12 PROCEDURE — 95806 SLEEP STUDY UNATT&RESP EFFT: CPT

## 2024-12-13 LAB
.: NORMAL
.: NORMAL

## 2024-12-17 ENCOUNTER — SLEEP STUDY (OUTPATIENT)
Facility: CLINIC | Age: 64
End: 2024-12-17
Payer: COMMERCIAL

## 2024-12-17 DIAGNOSIS — G47.9 SLEEP DISORDER: Primary | ICD-10-CM

## 2024-12-17 PROCEDURE — 95806 SLEEP STUDY UNATT&RESP EFFT: CPT | Performed by: OTHER

## 2024-12-26 PROBLEM — G47.33 OSA (OBSTRUCTIVE SLEEP APNEA): Status: ACTIVE | Noted: 2024-12-26

## 2025-06-08 DIAGNOSIS — E03.9 ACQUIRED HYPOTHYROIDISM: ICD-10-CM

## 2025-06-09 RX ORDER — LEVOTHYROXINE SODIUM 25 UG/1
TABLET ORAL
Qty: 154 TABLET | Refills: 1 | Status: SHIPPED | OUTPATIENT
Start: 2025-06-09

## 2025-06-09 NOTE — TELEPHONE ENCOUNTER
Failed protocol     Requested Prescriptions     Pending Prescriptions Disp Refills    levothyroxine (SYNTHROID) 25 MCG Oral Tab [Pharmacy Med Name: SYNTHROID TAB 25MCG] 154 tablet 3     Sig: TAKE 1 TABLET DAILY, AND ANEXTRA 25MCG FIVE TIMES A   WEEK (NEW DOSE)

## 2025-08-20 ENCOUNTER — TELEPHONE (OUTPATIENT)
Dept: FAMILY MEDICINE CLINIC | Facility: CLINIC | Age: 65
End: 2025-08-20

## 2025-08-20 DIAGNOSIS — E11.9 DIET-CONTROLLED TYPE 2 DIABETES MELLITUS (HCC): ICD-10-CM

## 2025-08-20 DIAGNOSIS — Z13.6 SCREENING FOR CARDIOVASCULAR CONDITION: ICD-10-CM

## 2025-08-20 DIAGNOSIS — Z13.0 SCREENING FOR IRON DEFICIENCY ANEMIA: ICD-10-CM

## 2025-08-20 DIAGNOSIS — Z13.1 SCREENING FOR DIABETES MELLITUS: ICD-10-CM

## 2025-08-20 DIAGNOSIS — Z00.00 ANNUAL PHYSICAL EXAM: Primary | ICD-10-CM
